# Patient Record
Sex: FEMALE | Race: WHITE | Employment: OTHER | URBAN - METROPOLITAN AREA
[De-identification: names, ages, dates, MRNs, and addresses within clinical notes are randomized per-mention and may not be internally consistent; named-entity substitution may affect disease eponyms.]

---

## 2021-02-09 PROBLEM — J42 CHRONIC BRONCHITIS (HCC): Status: ACTIVE | Noted: 2021-02-09

## 2021-02-09 PROBLEM — J84.10 PULMONARY FIBROSIS (HCC): Status: ACTIVE | Noted: 2021-02-09

## 2021-02-09 PROBLEM — R09.02 HYPOXIA: Status: ACTIVE | Noted: 2021-02-09

## 2021-07-09 LAB
A/G RATIO: 0.9 RATIO (ref 1.1–2.2)
ALBUMIN SERPL-MCNC: 3.4 G/DL (ref 3.4–4.8)
ALP BLD-CCNC: 58 U/L (ref 42–121)
ALT SERPL-CCNC: 12 U/L (ref 10–54)
ANION GAP SERPL CALCULATED.3IONS-SCNC: 11 MEQ/L (ref 3–11)
AST SERPL-CCNC: 16 U/L (ref 10–41)
BASOPHILS ABSOLUTE: 0.1 K/UL (ref 0–0.2)
BASOPHILS RELATIVE PERCENT: 0.8 % (ref 0–1.5)
BILIRUB SERPL-MCNC: 0.5 MG/DL (ref 0.3–1.5)
BUN BLDV-MCNC: 25 MG/DL (ref 8–21)
CALCIUM SERPL-MCNC: 8.8 MG/DL (ref 8.5–10.5)
CHLORIDE BLD-SCNC: 98 MEQ/L (ref 98–107)
CO2: 33 MEQ/L (ref 21–31)
CREAT SERPL-MCNC: 1.6 MG/DL (ref 0.4–1)
CREATININE + EGFR PANEL: 39 ML/MIN
CREATININE URINE: 72.3 MG/DL (ref 22–328)
EOSINOPHILS ABSOLUTE: 0.1 K/UL (ref 0–0.33)
EOSINOPHILS RELATIVE PERCENT: 1.1 % (ref 0–3)
FERRITIN: 27.3 NG/ML (ref 11–307)
GFR NON-AFRICAN AMERICAN: 32 ML/MIN
GLOBULIN: 3.8 G/DL (ref 1.9–3.9)
GLUCOSE BLD-MCNC: 226 MG/DL (ref 70–99)
HCT VFR BLD CALC: 29.7 % (ref 36–44)
HEMOGLOBIN: 9.8 G/DL (ref 12–15)
IRON SATURATION: 58 % (ref 15–55)
IRON: 190 UG/DL (ref 50–170)
LYMPHOCYTES ABSOLUTE: 0.9 K/UL (ref 1.1–4.8)
LYMPHOCYTES RELATIVE PERCENT: 9.6 % (ref 24–44)
MCH RBC QN AUTO: 30.8 PG (ref 28–34)
MCHC RBC AUTO-ENTMCNC: 33.1 G/DL (ref 33–37)
MCV RBC AUTO: 93.1 FL (ref 80–100)
MONOCYTES ABSOLUTE: 0.4 K/UL (ref 0.2–0.7)
MONOCYTES RELATIVE PERCENT: 4.2 % (ref 3.4–9)
NEUTROPHILS ABSOLUTE: 8 K/UL (ref 1.83–8.7)
PDW BLD-RTO: 16.8 % (ref 10.9–14.3)
PLATELET # BLD: 221 K/UL (ref 150–450)
PMV BLD AUTO: 8.4 FL (ref 7.4–10.4)
POTASSIUM SERPL-SCNC: 3.9 MEQ/L (ref 3.6–5)
PROTEIN/CREAT RATIO URINE RAN: 346 MG/G
RBC # BLD: 3.19 M/UL (ref 4–4.9)
SEGMENTED NEUTROPHILS RELATIVE PERCENT: 84.3 % (ref 40–74)
SODIUM BLD-SCNC: 142 MEQ/L (ref 135–145)
TOTAL IRON BINDING CAPACITY: 326 UG/DL (ref 250–450)
TOTAL PROTEIN, URINE: 25 MG/DL
TOTAL PROTEIN: 7.2 G/DL (ref 5.9–7.8)
TRANSFERRIN: 233 MG/DL (ref 192–382)
WBC # BLD: 9.5 K/UL (ref 4.5–11)

## 2022-01-03 LAB
A/G RATIO: 1 RATIO (ref 1.1–2.2)
ALBUMIN SERPL-MCNC: 3.5 G/DL (ref 3.4–4.8)
ALP BLD-CCNC: 65 U/L (ref 42–121)
ALT SERPL-CCNC: 13 U/L (ref 10–54)
ANION GAP SERPL CALCULATED.3IONS-SCNC: 10 MEQ/L (ref 3–11)
AST SERPL-CCNC: 14 U/L (ref 10–41)
BASOPHILS ABSOLUTE: 0 K/UL (ref 0–0.2)
BASOPHILS RELATIVE PERCENT: 0.4 % (ref 0–1.5)
BILIRUB SERPL-MCNC: 0.3 MG/DL (ref 0.3–1.5)
BUN BLDV-MCNC: 24 MG/DL (ref 8–21)
CALCIUM SERPL-MCNC: 9.1 MG/DL (ref 8.5–10.5)
CHLORIDE BLD-SCNC: 98 MEQ/L (ref 98–107)
CO2: 33 MEQ/L (ref 21–31)
CREAT SERPL-MCNC: 1.7 MG/DL (ref 0.4–1)
CREATININE + EGFR PANEL: 36 ML/MIN
CREATININE URINE: 92.2 MG/DL (ref 22–328)
EOSINOPHILS ABSOLUTE: 0.1 K/UL (ref 0–0.33)
EOSINOPHILS RELATIVE PERCENT: 1.1 % (ref 0–3)
GFR NON-AFRICAN AMERICAN: 30 ML/MIN
GLOBULIN: 3.6 G/DL (ref 1.9–3.9)
GLUCOSE BLD-MCNC: 190 MG/DL (ref 70–99)
HCT VFR BLD CALC: 35.2 % (ref 36–44)
HEMOGLOBIN: 11.2 G/DL (ref 12–15)
LYMPHOCYTES ABSOLUTE: 0.7 K/UL (ref 1.1–4.8)
LYMPHOCYTES RELATIVE PERCENT: 7.6 % (ref 24–44)
MAGNESIUM: 1.4 MEQ/L (ref 1.6–2.6)
MCH RBC QN AUTO: 30 PG (ref 28–34)
MCHC RBC AUTO-ENTMCNC: 31.8 G/DL (ref 33–37)
MCV RBC AUTO: 94.5 FL (ref 80–100)
MONOCYTES ABSOLUTE: 0.3 K/UL (ref 0.2–0.7)
MONOCYTES RELATIVE PERCENT: 3.7 % (ref 3.4–9)
NEUTROPHILS ABSOLUTE: 7.5 K/UL (ref 1.83–8.7)
PDW BLD-RTO: 16.1 % (ref 10.9–14.3)
PHOSPHORUS: 3.5 MG/DL (ref 2.5–4.6)
PLATELET # BLD: 198 K/UL (ref 150–450)
PMV BLD AUTO: 9.1 FL (ref 7.4–10.4)
POTASSIUM SERPL-SCNC: 3.9 MEQ/L (ref 3.6–5)
PROTEIN/CREAT RATIO URINE RAN: 510 MG/G
RBC # BLD: 3.72 M/UL (ref 4–4.9)
SEGMENTED NEUTROPHILS RELATIVE PERCENT: 87.2 % (ref 40–74)
SODIUM BLD-SCNC: 141 MEQ/L (ref 135–145)
TOTAL PROTEIN, URINE: 47 MG/DL
TOTAL PROTEIN: 7.1 G/DL (ref 5.9–7.8)
URATE: 9.4 MG/DL (ref 2.3–6.6)
WBC # BLD: 8.6 K/UL (ref 4.5–11)

## 2022-10-20 LAB
ANION GAP SERPL CALCULATED.3IONS-SCNC: 8 MEQ/L (ref 3–11)
BUN BLDV-MCNC: 34 MG/DL (ref 8–21)
CALCIUM SERPL-MCNC: 8.6 MG/DL (ref 8.5–10.5)
CHLORIDE BLD-SCNC: 98 MEQ/L (ref 98–107)
CO2: 33 MEQ/L (ref 21–31)
CREAT SERPL-MCNC: 2.1 MG/DL (ref 0.4–1)
CREATININE + EGFR PANEL: 28 ML/MIN
GFR NON-AFRICAN AMERICAN: 23 ML/MIN
GLUCOSE BLD-MCNC: 56 MG/DL (ref 70–99)
POTASSIUM SERPL-SCNC: 3.9 MEQ/L (ref 3.6–5)
SODIUM BLD-SCNC: 139 MEQ/L (ref 135–145)

## 2023-01-01 ENCOUNTER — HOSPITAL ENCOUNTER (INPATIENT)
Age: 68
LOS: 4 days | Discharge: OTHER INSTITUTION | DRG: 673 | End: 2023-01-05
Attending: EMERGENCY MEDICINE | Admitting: INTERNAL MEDICINE
Payer: MEDICARE

## 2023-01-01 ENCOUNTER — APPOINTMENT (OUTPATIENT)
Dept: GENERAL RADIOLOGY | Age: 68
DRG: 673 | End: 2023-01-01
Payer: MEDICARE

## 2023-01-01 DIAGNOSIS — R52 PAIN: ICD-10-CM

## 2023-01-01 DIAGNOSIS — S81.802A OPEN WOUND OF LEFT KNEE, LEG, AND ANKLE, INITIAL ENCOUNTER: ICD-10-CM

## 2023-01-01 DIAGNOSIS — S81.801A OPEN WOUND OF RIGHT KNEE, LEG, AND ANKLE, INITIAL ENCOUNTER: ICD-10-CM

## 2023-01-01 DIAGNOSIS — S91.002A OPEN WOUND OF LEFT KNEE, LEG, AND ANKLE, INITIAL ENCOUNTER: ICD-10-CM

## 2023-01-01 DIAGNOSIS — S91.001A OPEN WOUND OF RIGHT KNEE, LEG, AND ANKLE, INITIAL ENCOUNTER: ICD-10-CM

## 2023-01-01 DIAGNOSIS — R41.82 ALTERED MENTAL STATUS, UNSPECIFIED ALTERED MENTAL STATUS TYPE: ICD-10-CM

## 2023-01-01 DIAGNOSIS — S81.002A OPEN WOUND OF LEFT KNEE, LEG, AND ANKLE, INITIAL ENCOUNTER: ICD-10-CM

## 2023-01-01 DIAGNOSIS — N39.0 URINARY TRACT INFECTION WITHOUT HEMATURIA, SITE UNSPECIFIED: Primary | ICD-10-CM

## 2023-01-01 DIAGNOSIS — S81.001A OPEN WOUND OF RIGHT KNEE, LEG, AND ANKLE, INITIAL ENCOUNTER: ICD-10-CM

## 2023-01-01 PROBLEM — G93.40 ENCEPHALOPATHY: Status: ACTIVE | Noted: 2023-01-01

## 2023-01-01 PROBLEM — N17.9 AKI (ACUTE KIDNEY INJURY) (HCC): Status: ACTIVE | Noted: 2023-01-01

## 2023-01-01 LAB
ALBUMIN SERPL-MCNC: 3.1 G/DL (ref 3.5–5.2)
ALP BLD-CCNC: 67 U/L (ref 35–104)
ALT SERPL-CCNC: 8 U/L (ref 0–32)
AMORPHOUS: ABNORMAL
ANION GAP SERPL CALCULATED.3IONS-SCNC: 12 MMOL/L (ref 7–16)
AST SERPL-CCNC: 11 U/L (ref 0–31)
BACTERIA: ABNORMAL /HPF
BASOPHILS ABSOLUTE: 0.04 E9/L (ref 0–0.2)
BASOPHILS RELATIVE PERCENT: 0.4 % (ref 0–2)
BILIRUB SERPL-MCNC: 0.3 MG/DL (ref 0–1.2)
BILIRUBIN URINE: NEGATIVE
BLOOD, URINE: ABNORMAL
BUN BLDV-MCNC: 48 MG/DL (ref 6–23)
CALCIUM SERPL-MCNC: 8.7 MG/DL (ref 8.6–10.2)
CHLORIDE BLD-SCNC: 99 MMOL/L (ref 98–107)
CLARITY: CLEAR
CO2: 29 MMOL/L (ref 22–29)
COLOR: YELLOW
CREAT SERPL-MCNC: 2.6 MG/DL (ref 0.5–1)
EOSINOPHILS ABSOLUTE: 0.16 E9/L (ref 0.05–0.5)
EOSINOPHILS RELATIVE PERCENT: 1.4 % (ref 0–6)
GFR SERPL CREATININE-BSD FRML MDRD: 19 ML/MIN/1.73
GLUCOSE BLD-MCNC: 126 MG/DL (ref 74–99)
GLUCOSE URINE: NEGATIVE MG/DL
HCT VFR BLD CALC: 26.7 % (ref 34–48)
HEMOGLOBIN: 8.2 G/DL (ref 11.5–15.5)
IMMATURE GRANULOCYTES #: 0.11 E9/L
IMMATURE GRANULOCYTES %: 1 % (ref 0–5)
KETONES, URINE: NEGATIVE MG/DL
LACTIC ACID: 0.9 MMOL/L (ref 0.5–2.2)
LEUKOCYTE ESTERASE, URINE: ABNORMAL
LYMPHOCYTES ABSOLUTE: 0.79 E9/L (ref 1.5–4)
LYMPHOCYTES RELATIVE PERCENT: 7.1 % (ref 20–42)
MCH RBC QN AUTO: 27.4 PG (ref 26–35)
MCHC RBC AUTO-ENTMCNC: 30.7 % (ref 32–34.5)
MCV RBC AUTO: 89.3 FL (ref 80–99.9)
METER GLUCOSE: 122 MG/DL (ref 74–99)
METER GLUCOSE: 134 MG/DL (ref 74–99)
MONOCYTES ABSOLUTE: 0.66 E9/L (ref 0.1–0.95)
MONOCYTES RELATIVE PERCENT: 5.9 % (ref 2–12)
NEUTROPHILS ABSOLUTE: 9.38 E9/L (ref 1.8–7.3)
NEUTROPHILS RELATIVE PERCENT: 84.2 % (ref 43–80)
NITRITE, URINE: POSITIVE
PDW BLD-RTO: 15.2 FL (ref 11.5–15)
PH UA: 6 (ref 5–9)
PLATELET # BLD: 290 E9/L (ref 130–450)
PMV BLD AUTO: 11.1 FL (ref 7–12)
POTASSIUM REFLEX MAGNESIUM: 4.4 MMOL/L (ref 3.5–5)
PROTEIN UA: 100 MG/DL
RBC # BLD: 2.99 E12/L (ref 3.5–5.5)
RBC UA: ABNORMAL /HPF (ref 0–2)
SODIUM BLD-SCNC: 140 MMOL/L (ref 132–146)
SPECIFIC GRAVITY UA: 1.01 (ref 1–1.03)
TOTAL PROTEIN: 6.8 G/DL (ref 6.4–8.3)
TROPONIN, HIGH SENSITIVITY: 97 NG/L (ref 0–9)
TROPONIN, HIGH SENSITIVITY: 97 NG/L (ref 0–9)
UROBILINOGEN, URINE: 0.2 E.U./DL
WBC # BLD: 11.1 E9/L (ref 4.5–11.5)
WBC UA: >20 /HPF (ref 0–5)

## 2023-01-01 PROCEDURE — 87088 URINE BACTERIA CULTURE: CPT

## 2023-01-01 PROCEDURE — 6360000002 HC RX W HCPCS

## 2023-01-01 PROCEDURE — 6370000000 HC RX 637 (ALT 250 FOR IP)

## 2023-01-01 PROCEDURE — 93005 ELECTROCARDIOGRAM TRACING: CPT | Performed by: PHYSICIAN ASSISTANT

## 2023-01-01 PROCEDURE — 73630 X-RAY EXAM OF FOOT: CPT

## 2023-01-01 PROCEDURE — 2580000003 HC RX 258

## 2023-01-01 PROCEDURE — 83605 ASSAY OF LACTIC ACID: CPT

## 2023-01-01 PROCEDURE — 2700000000 HC OXYGEN THERAPY PER DAY

## 2023-01-01 PROCEDURE — 2500000003 HC RX 250 WO HCPCS: Performed by: INTERNAL MEDICINE

## 2023-01-01 PROCEDURE — 2580000003 HC RX 258: Performed by: EMERGENCY MEDICINE

## 2023-01-01 PROCEDURE — 51701 INSERT BLADDER CATHETER: CPT

## 2023-01-01 PROCEDURE — 82962 GLUCOSE BLOOD TEST: CPT

## 2023-01-01 PROCEDURE — 94640 AIRWAY INHALATION TREATMENT: CPT

## 2023-01-01 PROCEDURE — 6370000000 HC RX 637 (ALT 250 FOR IP): Performed by: INTERNAL MEDICINE

## 2023-01-01 PROCEDURE — 85025 COMPLETE CBC W/AUTO DIFF WBC: CPT

## 2023-01-01 PROCEDURE — 80053 COMPREHEN METABOLIC PANEL: CPT

## 2023-01-01 PROCEDURE — 73590 X-RAY EXAM OF LOWER LEG: CPT

## 2023-01-01 PROCEDURE — 6360000002 HC RX W HCPCS: Performed by: EMERGENCY MEDICINE

## 2023-01-01 PROCEDURE — 99223 1ST HOSP IP/OBS HIGH 75: CPT | Performed by: INTERNAL MEDICINE

## 2023-01-01 PROCEDURE — 99285 EMERGENCY DEPT VISIT HI MDM: CPT

## 2023-01-01 PROCEDURE — 87040 BLOOD CULTURE FOR BACTERIA: CPT

## 2023-01-01 PROCEDURE — 1200000000 HC SEMI PRIVATE

## 2023-01-01 PROCEDURE — 84484 ASSAY OF TROPONIN QUANT: CPT

## 2023-01-01 PROCEDURE — 87186 SC STD MICRODIL/AGAR DIL: CPT

## 2023-01-01 PROCEDURE — 81001 URINALYSIS AUTO W/SCOPE: CPT

## 2023-01-01 RX ORDER — DOCUSATE SODIUM 100 MG/1
100 CAPSULE, LIQUID FILLED ORAL DAILY
Status: DISCONTINUED | OUTPATIENT
Start: 2023-01-01 | End: 2023-01-05 | Stop reason: HOSPADM

## 2023-01-01 RX ORDER — DIGOXIN 125 MCG
125 TABLET ORAL DAILY
Status: DISCONTINUED | OUTPATIENT
Start: 2023-01-01 | End: 2023-01-05 | Stop reason: HOSPADM

## 2023-01-01 RX ORDER — INSULIN GLARGINE 100 [IU]/ML
15 INJECTION, SOLUTION SUBCUTANEOUS NIGHTLY
Status: DISCONTINUED | OUTPATIENT
Start: 2023-01-01 | End: 2023-01-05 | Stop reason: HOSPADM

## 2023-01-01 RX ORDER — ASPIRIN 81 MG/1
81 TABLET, CHEWABLE ORAL DAILY
Status: DISCONTINUED | OUTPATIENT
Start: 2023-01-01 | End: 2023-01-05 | Stop reason: HOSPADM

## 2023-01-01 RX ORDER — POLYETHYLENE GLYCOL 3350 17 G/17G
17 POWDER, FOR SOLUTION ORAL DAILY PRN
Status: DISCONTINUED | OUTPATIENT
Start: 2023-01-01 | End: 2023-01-05 | Stop reason: HOSPADM

## 2023-01-01 RX ORDER — SODIUM CHLORIDE 9 MG/ML
INJECTION, SOLUTION INTRAVENOUS CONTINUOUS
Status: DISCONTINUED | OUTPATIENT
Start: 2023-01-01 | End: 2023-01-05

## 2023-01-01 RX ORDER — INSULIN LISPRO 100 [IU]/ML
0-4 INJECTION, SOLUTION INTRAVENOUS; SUBCUTANEOUS
Status: DISCONTINUED | OUTPATIENT
Start: 2023-01-01 | End: 2023-01-05 | Stop reason: HOSPADM

## 2023-01-01 RX ORDER — SODIUM CHLORIDE 9 MG/ML
INJECTION, SOLUTION INTRAVENOUS PRN
Status: DISCONTINUED | OUTPATIENT
Start: 2023-01-01 | End: 2023-01-05 | Stop reason: HOSPADM

## 2023-01-01 RX ORDER — INSULIN GLARGINE 100 [IU]/ML
22 INJECTION, SOLUTION SUBCUTANEOUS EVERY MORNING
Status: DISCONTINUED | OUTPATIENT
Start: 2023-01-02 | End: 2023-01-05 | Stop reason: HOSPADM

## 2023-01-01 RX ORDER — INSULIN LISPRO 100 [IU]/ML
0-4 INJECTION, SOLUTION INTRAVENOUS; SUBCUTANEOUS NIGHTLY
Status: DISCONTINUED | OUTPATIENT
Start: 2023-01-01 | End: 2023-01-05 | Stop reason: HOSPADM

## 2023-01-01 RX ORDER — METOLAZONE 2.5 MG/1
2.5 TABLET ORAL DAILY
Status: DISCONTINUED | OUTPATIENT
Start: 2023-01-01 | End: 2023-01-05

## 2023-01-01 RX ORDER — PIRFENIDONE 801 MG/1
801 TABLET, FILM COATED ORAL DAILY
Status: DISCONTINUED | OUTPATIENT
Start: 2023-01-02 | End: 2023-01-04

## 2023-01-01 RX ORDER — DILTIAZEM HYDROCHLORIDE 120 MG/1
120 CAPSULE, EXTENDED RELEASE ORAL EVERY 12 HOURS SCHEDULED
Status: DISCONTINUED | OUTPATIENT
Start: 2023-01-01 | End: 2023-01-05 | Stop reason: HOSPADM

## 2023-01-01 RX ORDER — PRAVASTATIN SODIUM 20 MG
20 TABLET ORAL DAILY
Status: DISCONTINUED | OUTPATIENT
Start: 2023-01-01 | End: 2023-01-05 | Stop reason: HOSPADM

## 2023-01-01 RX ORDER — ONDANSETRON 4 MG/1
4 TABLET, ORALLY DISINTEGRATING ORAL EVERY 8 HOURS PRN
Status: DISCONTINUED | OUTPATIENT
Start: 2023-01-01 | End: 2023-01-05 | Stop reason: HOSPADM

## 2023-01-01 RX ORDER — LANOLIN ALCOHOL/MO/W.PET/CERES
400 CREAM (GRAM) TOPICAL DAILY
Status: DISCONTINUED | OUTPATIENT
Start: 2023-01-01 | End: 2023-01-05 | Stop reason: HOSPADM

## 2023-01-01 RX ORDER — ACETAMINOPHEN 325 MG/1
650 TABLET ORAL EVERY 6 HOURS PRN
Status: DISCONTINUED | OUTPATIENT
Start: 2023-01-01 | End: 2023-01-02

## 2023-01-01 RX ORDER — DEXTROSE MONOHYDRATE 100 MG/ML
INJECTION, SOLUTION INTRAVENOUS CONTINUOUS PRN
Status: DISCONTINUED | OUTPATIENT
Start: 2023-01-01 | End: 2023-01-05 | Stop reason: HOSPADM

## 2023-01-01 RX ORDER — IPRATROPIUM BROMIDE AND ALBUTEROL SULFATE 2.5; .5 MG/3ML; MG/3ML
1 SOLUTION RESPIRATORY (INHALATION) EVERY 4 HOURS PRN
Status: DISCONTINUED | OUTPATIENT
Start: 2023-01-01 | End: 2023-01-05 | Stop reason: HOSPADM

## 2023-01-01 RX ORDER — INSULIN GLARGINE 100 [IU]/ML
INJECTION, SOLUTION SUBCUTANEOUS 2 TIMES DAILY
Status: ON HOLD | COMMUNITY
End: 2023-01-05 | Stop reason: HOSPADM

## 2023-01-01 RX ORDER — ONDANSETRON 2 MG/ML
4 INJECTION INTRAMUSCULAR; INTRAVENOUS EVERY 6 HOURS PRN
Status: DISCONTINUED | OUTPATIENT
Start: 2023-01-01 | End: 2023-01-05 | Stop reason: HOSPADM

## 2023-01-01 RX ORDER — FERROUS SULFATE 325(65) MG
325 TABLET ORAL
Status: DISCONTINUED | OUTPATIENT
Start: 2023-01-02 | End: 2023-01-05 | Stop reason: HOSPADM

## 2023-01-01 RX ORDER — ACETAMINOPHEN 650 MG/1
650 SUPPOSITORY RECTAL EVERY 6 HOURS PRN
Status: DISCONTINUED | OUTPATIENT
Start: 2023-01-01 | End: 2023-01-02

## 2023-01-01 RX ORDER — BUDESONIDE 0.5 MG/2ML
0.5 INHALANT ORAL 2 TIMES DAILY
Status: DISCONTINUED | OUTPATIENT
Start: 2023-01-01 | End: 2023-01-05 | Stop reason: HOSPADM

## 2023-01-01 RX ORDER — SODIUM CHLORIDE 0.9 % (FLUSH) 0.9 %
5-40 SYRINGE (ML) INJECTION EVERY 12 HOURS SCHEDULED
Status: DISCONTINUED | OUTPATIENT
Start: 2023-01-01 | End: 2023-01-05 | Stop reason: HOSPADM

## 2023-01-01 RX ORDER — LEVOTHYROXINE SODIUM 0.1 MG/1
200 TABLET ORAL DAILY
Status: DISCONTINUED | OUTPATIENT
Start: 2023-01-02 | End: 2023-01-05 | Stop reason: HOSPADM

## 2023-01-01 RX ORDER — FLUOXETINE HYDROCHLORIDE 20 MG/1
20 CAPSULE ORAL 2 TIMES DAILY
Status: DISCONTINUED | OUTPATIENT
Start: 2023-01-01 | End: 2023-01-05 | Stop reason: HOSPADM

## 2023-01-01 RX ORDER — GABAPENTIN 300 MG/1
300 CAPSULE ORAL 3 TIMES DAILY
Status: DISCONTINUED | OUTPATIENT
Start: 2023-01-01 | End: 2023-01-04

## 2023-01-01 RX ORDER — SODIUM CHLORIDE 0.9 % (FLUSH) 0.9 %
5-40 SYRINGE (ML) INJECTION PRN
Status: DISCONTINUED | OUTPATIENT
Start: 2023-01-01 | End: 2023-01-05 | Stop reason: HOSPADM

## 2023-01-01 RX ADMIN — DOCUSATE SODIUM 100 MG: 100 CAPSULE, LIQUID FILLED ORAL at 21:54

## 2023-01-01 RX ADMIN — SODIUM CHLORIDE: 9 INJECTION, SOLUTION INTRAVENOUS at 23:27

## 2023-01-01 RX ADMIN — INSULIN GLARGINE 15 UNITS: 100 INJECTION, SOLUTION SUBCUTANEOUS at 21:55

## 2023-01-01 RX ADMIN — SODIUM CHLORIDE, PRESERVATIVE FREE 10 ML: 5 INJECTION INTRAVENOUS at 22:00

## 2023-01-01 RX ADMIN — ASPIRIN 81 MG: 81 TABLET, CHEWABLE ORAL at 21:54

## 2023-01-01 RX ADMIN — MICONAZOLE NITRATE: 2 POWDER TOPICAL at 23:50

## 2023-01-01 RX ADMIN — BUDESONIDE 500 MCG: 0.5 SUSPENSION RESPIRATORY (INHALATION) at 20:41

## 2023-01-01 RX ADMIN — PETROLATUM: 420 OINTMENT TOPICAL at 23:50

## 2023-01-01 RX ADMIN — DILTIAZEM HYDROCHLORIDE 120 MG: 120 CAPSULE, EXTENDED RELEASE ORAL at 21:54

## 2023-01-01 RX ADMIN — WATER 1000 MG: 1 INJECTION INTRAMUSCULAR; INTRAVENOUS; SUBCUTANEOUS at 18:05

## 2023-01-01 RX ADMIN — FLUOXETINE 20 MG: 20 CAPSULE ORAL at 21:54

## 2023-01-01 RX ADMIN — IPRATROPIUM BROMIDE AND ALBUTEROL SULFATE 3 ML: .5; 2.5 SOLUTION RESPIRATORY (INHALATION) at 20:42

## 2023-01-01 RX ADMIN — GABAPENTIN 300 MG: 300 CAPSULE ORAL at 21:54

## 2023-01-01 RX ADMIN — Medication 400 MG: at 21:55

## 2023-01-01 RX ADMIN — APIXABAN 5 MG: 5 TABLET, FILM COATED ORAL at 21:54

## 2023-01-01 RX ADMIN — METOLAZONE 2.5 MG: 2.5 TABLET ORAL at 21:54

## 2023-01-01 RX ADMIN — PRAVASTATIN SODIUM 20 MG: 20 TABLET ORAL at 21:54

## 2023-01-01 RX ADMIN — DIGOXIN 125 MCG: 0.12 TABLET ORAL at 21:54

## 2023-01-01 ASSESSMENT — ENCOUNTER SYMPTOMS
EYE PAIN: 0
SHORTNESS OF BREATH: 1
COUGH: 0
BACK PAIN: 0
CONSTIPATION: 0
ABDOMINAL PAIN: 0
SHORTNESS OF BREATH: 0
DIARRHEA: 0
SORE THROAT: 0
NAUSEA: 0
SINUS PAIN: 0
VOMITING: 0

## 2023-01-01 ASSESSMENT — PAIN DESCRIPTION - ORIENTATION: ORIENTATION: RIGHT

## 2023-01-01 ASSESSMENT — PAIN DESCRIPTION - FREQUENCY: FREQUENCY: CONTINUOUS

## 2023-01-01 ASSESSMENT — PAIN DESCRIPTION - ONSET: ONSET: ON-GOING

## 2023-01-01 ASSESSMENT — PAIN DESCRIPTION - LOCATION: LOCATION: HAND

## 2023-01-01 ASSESSMENT — PAIN DESCRIPTION - PAIN TYPE: TYPE: ACUTE PAIN

## 2023-01-01 ASSESSMENT — PAIN DESCRIPTION - DESCRIPTORS: DESCRIPTORS: BURNING

## 2023-01-01 ASSESSMENT — PAIN SCALES - GENERAL: PAINLEVEL_OUTOF10: 4
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
## 2023-01-02 ENCOUNTER — APPOINTMENT (OUTPATIENT)
Dept: MRI IMAGING | Age: 68
DRG: 673 | End: 2023-01-02
Payer: MEDICARE

## 2023-01-02 ENCOUNTER — ANESTHESIA EVENT (OUTPATIENT)
Dept: OPERATING ROOM | Age: 68
DRG: 673 | End: 2023-01-02
Payer: MEDICARE

## 2023-01-02 ENCOUNTER — APPOINTMENT (OUTPATIENT)
Dept: GENERAL RADIOLOGY | Age: 68
DRG: 673 | End: 2023-01-02
Payer: MEDICARE

## 2023-01-02 ENCOUNTER — ANESTHESIA (OUTPATIENT)
Dept: OPERATING ROOM | Age: 68
DRG: 673 | End: 2023-01-02
Payer: MEDICARE

## 2023-01-02 ENCOUNTER — APPOINTMENT (OUTPATIENT)
Dept: ULTRASOUND IMAGING | Age: 68
DRG: 673 | End: 2023-01-02
Payer: MEDICARE

## 2023-01-02 LAB
ANION GAP SERPL CALCULATED.3IONS-SCNC: 8 MMOL/L (ref 7–16)
BASOPHILS ABSOLUTE: 0.04 E9/L (ref 0–0.2)
BASOPHILS RELATIVE PERCENT: 0.3 % (ref 0–2)
BUN BLDV-MCNC: 49 MG/DL (ref 6–23)
CALCIUM SERPL-MCNC: 8.7 MG/DL (ref 8.6–10.2)
CHLORIDE BLD-SCNC: 101 MMOL/L (ref 98–107)
CO2: 31 MMOL/L (ref 22–29)
CREAT SERPL-MCNC: 2.6 MG/DL (ref 0.5–1)
EKG ATRIAL RATE: 97 BPM
EKG Q-T INTERVAL: 420 MS
EKG QRS DURATION: 166 MS
EKG QTC CALCULATION (BAZETT): 469 MS
EKG R AXIS: 95 DEGREES
EKG T AXIS: -65 DEGREES
EKG VENTRICULAR RATE: 75 BPM
EOSINOPHILS ABSOLUTE: 0.14 E9/L (ref 0.05–0.5)
EOSINOPHILS RELATIVE PERCENT: 1.2 % (ref 0–6)
GFR SERPL CREATININE-BSD FRML MDRD: 19 ML/MIN/1.73
GLUCOSE BLD-MCNC: 160 MG/DL (ref 74–99)
HCT VFR BLD CALC: 28.1 % (ref 34–48)
HEMOGLOBIN: 8.2 G/DL (ref 11.5–15.5)
IMMATURE GRANULOCYTES #: 0.11 E9/L
IMMATURE GRANULOCYTES %: 0.9 % (ref 0–5)
LYMPHOCYTES ABSOLUTE: 1.08 E9/L (ref 1.5–4)
LYMPHOCYTES RELATIVE PERCENT: 9.2 % (ref 20–42)
MCH RBC QN AUTO: 26.6 PG (ref 26–35)
MCHC RBC AUTO-ENTMCNC: 29.2 % (ref 32–34.5)
MCV RBC AUTO: 91.2 FL (ref 80–99.9)
METER GLUCOSE: 120 MG/DL (ref 74–99)
METER GLUCOSE: 128 MG/DL (ref 74–99)
METER GLUCOSE: 130 MG/DL (ref 74–99)
METER GLUCOSE: 132 MG/DL (ref 74–99)
METER GLUCOSE: 146 MG/DL (ref 74–99)
MONOCYTES ABSOLUTE: 0.68 E9/L (ref 0.1–0.95)
MONOCYTES RELATIVE PERCENT: 5.8 % (ref 2–12)
NEUTROPHILS ABSOLUTE: 9.69 E9/L (ref 1.8–7.3)
NEUTROPHILS RELATIVE PERCENT: 82.6 % (ref 43–80)
PDW BLD-RTO: 15.5 FL (ref 11.5–15)
PLATELET # BLD: 307 E9/L (ref 130–450)
PMV BLD AUTO: 11.1 FL (ref 7–12)
POTASSIUM REFLEX MAGNESIUM: 4 MMOL/L (ref 3.5–5)
RBC # BLD: 3.08 E12/L (ref 3.5–5.5)
SODIUM BLD-SCNC: 140 MMOL/L (ref 132–146)
WBC # BLD: 11.7 E9/L (ref 4.5–11.5)

## 2023-01-02 PROCEDURE — 73718 MRI LOWER EXTREMITY W/O DYE: CPT

## 2023-01-02 PROCEDURE — 2709999900 HC NON-CHARGEABLE SUPPLY

## 2023-01-02 PROCEDURE — 88304 TISSUE EXAM BY PATHOLOGIST: CPT

## 2023-01-02 PROCEDURE — 6360000002 HC RX W HCPCS: Performed by: NURSE ANESTHETIST, CERTIFIED REGISTERED

## 2023-01-02 PROCEDURE — 87116 MYCOBACTERIA CULTURE: CPT

## 2023-01-02 PROCEDURE — 0JQQ0ZZ REPAIR RIGHT FOOT SUBCUTANEOUS TISSUE AND FASCIA, OPEN APPROACH: ICD-10-PCS

## 2023-01-02 PROCEDURE — 80048 BASIC METABOLIC PNL TOTAL CA: CPT

## 2023-01-02 PROCEDURE — 7100000010 HC PHASE II RECOVERY - FIRST 15 MIN

## 2023-01-02 PROCEDURE — 0HRNX74 REPLACEMENT OF LEFT FOOT SKIN WITH AUTOLOGOUS TISSUE SUBSTITUTE, PARTIAL THICKNESS, EXTERNAL APPROACH: ICD-10-PCS

## 2023-01-02 PROCEDURE — 2700000000 HC OXYGEN THERAPY PER DAY

## 2023-01-02 PROCEDURE — 87186 SC STD MICRODIL/AGAR DIL: CPT

## 2023-01-02 PROCEDURE — 3700000000 HC ANESTHESIA ATTENDED CARE

## 2023-01-02 PROCEDURE — 0JBR0ZZ EXCISION OF LEFT FOOT SUBCUTANEOUS TISSUE AND FASCIA, OPEN APPROACH: ICD-10-PCS

## 2023-01-02 PROCEDURE — 3600000002 HC SURGERY LEVEL 2 BASE

## 2023-01-02 PROCEDURE — 93010 ELECTROCARDIOGRAM REPORT: CPT | Performed by: INTERNAL MEDICINE

## 2023-01-02 PROCEDURE — 87077 CULTURE AEROBIC IDENTIFY: CPT

## 2023-01-02 PROCEDURE — 87206 SMEAR FLUORESCENT/ACID STAI: CPT

## 2023-01-02 PROCEDURE — 87205 SMEAR GRAM STAIN: CPT

## 2023-01-02 PROCEDURE — 73130 X-RAY EXAM OF HAND: CPT

## 2023-01-02 PROCEDURE — 85025 COMPLETE CBC W/AUTO DIFF WBC: CPT

## 2023-01-02 PROCEDURE — 3700000001 HC ADD 15 MINUTES (ANESTHESIA)

## 2023-01-02 PROCEDURE — 36415 COLL VENOUS BLD VENIPUNCTURE: CPT

## 2023-01-02 PROCEDURE — 99232 SBSQ HOSP IP/OBS MODERATE 35: CPT | Performed by: NURSE PRACTITIONER

## 2023-01-02 PROCEDURE — 94640 AIRWAY INHALATION TREATMENT: CPT

## 2023-01-02 PROCEDURE — 2580000003 HC RX 258: Performed by: NURSE ANESTHETIST, CERTIFIED REGISTERED

## 2023-01-02 PROCEDURE — 2580000003 HC RX 258

## 2023-01-02 PROCEDURE — 1200000000 HC SEMI PRIVATE

## 2023-01-02 PROCEDURE — 87075 CULTR BACTERIA EXCEPT BLOOD: CPT

## 2023-01-02 PROCEDURE — 99232 SBSQ HOSP IP/OBS MODERATE 35: CPT | Performed by: INTERNAL MEDICINE

## 2023-01-02 PROCEDURE — 82962 GLUCOSE BLOOD TEST: CPT

## 2023-01-02 PROCEDURE — 6370000000 HC RX 637 (ALT 250 FOR IP)

## 2023-01-02 PROCEDURE — 7100000011 HC PHASE II RECOVERY - ADDTL 15 MIN

## 2023-01-02 PROCEDURE — 87070 CULTURE OTHR SPECIMN AEROBIC: CPT

## 2023-01-02 PROCEDURE — 3600000012 HC SURGERY LEVEL 2 ADDTL 15MIN

## 2023-01-02 PROCEDURE — 87102 FUNGUS ISOLATION CULTURE: CPT

## 2023-01-02 DEVICE — IMPLANTABLE DEVICE: Type: IMPLANTABLE DEVICE | Site: FOOT | Status: FUNCTIONAL

## 2023-01-02 RX ORDER — ACETAMINOPHEN 325 MG/1
650 TABLET ORAL EVERY 4 HOURS PRN
Status: DISCONTINUED | OUTPATIENT
Start: 2023-01-02 | End: 2023-01-05 | Stop reason: HOSPADM

## 2023-01-02 RX ORDER — SODIUM CHLORIDE 0.9 % (FLUSH) 0.9 %
5-40 SYRINGE (ML) INJECTION EVERY 12 HOURS SCHEDULED
Status: DISCONTINUED | OUTPATIENT
Start: 2023-01-02 | End: 2023-01-02 | Stop reason: HOSPADM

## 2023-01-02 RX ORDER — PROPOFOL 10 MG/ML
INJECTION, EMULSION INTRAVENOUS CONTINUOUS PRN
Status: DISCONTINUED | OUTPATIENT
Start: 2023-01-02 | End: 2023-01-02 | Stop reason: SDUPTHER

## 2023-01-02 RX ORDER — FENTANYL CITRATE 50 UG/ML
INJECTION, SOLUTION INTRAMUSCULAR; INTRAVENOUS PRN
Status: DISCONTINUED | OUTPATIENT
Start: 2023-01-02 | End: 2023-01-02 | Stop reason: SDUPTHER

## 2023-01-02 RX ORDER — ONDANSETRON 2 MG/ML
INJECTION INTRAMUSCULAR; INTRAVENOUS PRN
Status: DISCONTINUED | OUTPATIENT
Start: 2023-01-02 | End: 2023-01-02 | Stop reason: SDUPTHER

## 2023-01-02 RX ORDER — TRAMADOL HYDROCHLORIDE 50 MG/1
50 TABLET ORAL EVERY 6 HOURS PRN
Status: DISCONTINUED | OUTPATIENT
Start: 2023-01-02 | End: 2023-01-05 | Stop reason: HOSPADM

## 2023-01-02 RX ORDER — SODIUM CHLORIDE 9 MG/ML
INJECTION, SOLUTION INTRAVENOUS PRN
Status: DISCONTINUED | OUTPATIENT
Start: 2023-01-02 | End: 2023-01-02 | Stop reason: HOSPADM

## 2023-01-02 RX ORDER — ONDANSETRON 2 MG/ML
4 INJECTION INTRAMUSCULAR; INTRAVENOUS
Status: DISCONTINUED | OUTPATIENT
Start: 2023-01-02 | End: 2023-01-02 | Stop reason: HOSPADM

## 2023-01-02 RX ORDER — SODIUM CHLORIDE 0.9 % (FLUSH) 0.9 %
5-40 SYRINGE (ML) INJECTION PRN
Status: DISCONTINUED | OUTPATIENT
Start: 2023-01-02 | End: 2023-01-02 | Stop reason: HOSPADM

## 2023-01-02 RX ORDER — SODIUM CHLORIDE 9 MG/ML
INJECTION, SOLUTION INTRAVENOUS CONTINUOUS PRN
Status: DISCONTINUED | OUTPATIENT
Start: 2023-01-02 | End: 2023-01-02 | Stop reason: SDUPTHER

## 2023-01-02 RX ORDER — FENTANYL CITRATE 50 UG/ML
25 INJECTION, SOLUTION INTRAMUSCULAR; INTRAVENOUS EVERY 5 MIN PRN
Status: DISCONTINUED | OUTPATIENT
Start: 2023-01-02 | End: 2023-01-02 | Stop reason: HOSPADM

## 2023-01-02 RX ADMIN — PROPOFOL 60 MCG/KG/MIN: 10 INJECTION, EMULSION INTRAVENOUS at 17:40

## 2023-01-02 RX ADMIN — SODIUM CHLORIDE: 9 INJECTION, SOLUTION INTRAVENOUS at 20:21

## 2023-01-02 RX ADMIN — FENTANYL CITRATE 50 MCG: 50 INJECTION, SOLUTION INTRAMUSCULAR; INTRAVENOUS at 17:48

## 2023-01-02 RX ADMIN — SODIUM CHLORIDE, PRESERVATIVE FREE 10 ML: 5 INJECTION INTRAVENOUS at 20:23

## 2023-01-02 RX ADMIN — ONDANSETRON 4 MG: 2 INJECTION INTRAMUSCULAR; INTRAVENOUS at 17:49

## 2023-01-02 RX ADMIN — INSULIN GLARGINE 15 UNITS: 100 INJECTION, SOLUTION SUBCUTANEOUS at 20:23

## 2023-01-02 RX ADMIN — FLUOXETINE 20 MG: 20 CAPSULE ORAL at 20:22

## 2023-01-02 RX ADMIN — DILTIAZEM HYDROCHLORIDE 120 MG: 120 CAPSULE, EXTENDED RELEASE ORAL at 20:22

## 2023-01-02 RX ADMIN — GABAPENTIN 300 MG: 300 CAPSULE ORAL at 20:23

## 2023-01-02 RX ADMIN — APIXABAN 5 MG: 5 TABLET, FILM COATED ORAL at 20:22

## 2023-01-02 RX ADMIN — BUDESONIDE 500 MCG: 0.5 SUSPENSION RESPIRATORY (INHALATION) at 07:50

## 2023-01-02 RX ADMIN — MICONAZOLE NITRATE: 2 POWDER TOPICAL at 08:44

## 2023-01-02 RX ADMIN — SODIUM CHLORIDE: 9 INJECTION, SOLUTION INTRAVENOUS at 10:23

## 2023-01-02 RX ADMIN — PETROLATUM: 420 OINTMENT TOPICAL at 20:21

## 2023-01-02 RX ADMIN — BUDESONIDE 500 MCG: 0.5 SUSPENSION RESPIRATORY (INHALATION) at 19:47

## 2023-01-02 RX ADMIN — DIGOXIN 125 MCG: 0.12 TABLET ORAL at 15:08

## 2023-01-02 RX ADMIN — MICONAZOLE NITRATE: 2 POWDER TOPICAL at 20:21

## 2023-01-02 RX ADMIN — FENTANYL CITRATE 50 MCG: 50 INJECTION, SOLUTION INTRAMUSCULAR; INTRAVENOUS at 17:40

## 2023-01-02 RX ADMIN — SODIUM CHLORIDE: 9 INJECTION, SOLUTION INTRAVENOUS at 16:30

## 2023-01-02 ASSESSMENT — ENCOUNTER SYMPTOMS: SHORTNESS OF BREATH: 1

## 2023-01-02 ASSESSMENT — PAIN SCALES - GENERAL
PAINLEVEL_OUTOF10: 0

## 2023-01-02 NOTE — PROGRESS NOTES
Page placed to Dr. Jonny Vizcaino regarding xray results.     Electronically signed by Arden Brittle, RN on 1/2/2023 at 4:17 PM

## 2023-01-02 NOTE — OP NOTE
Operative Note      Patient: Dianne Pinzon  YOB: 1955  MRN: [id number]    Date of Procedure: 1/2/2023    Pre-Op Diagnosis: Right foot laceration with wound to subcutaneous layer; Left anterior leg wound to dermis and left foot wound to subcutaneous layer    Post-Op Diagnosis: Same       Procedure(s):  BILATERAL DEBRIDEMENT INCISION AND DRAINAGE OF FEET AND LEGS, RESECECTION OF ALL NONVIABLE TISSUE AND BONE WITH SKIN GRAFT SUBSTITUTE APPLICATION TO LEFT FOOT WOUND    Surgeon(s):  Law Puri MD    Assistant: Joaquina Oliveira DPM, PGY1    Anesthesia: Monitor Anesthesia Care    Estimated Blood Loss (mL): 56WD    Complications: None    Specimens:   ID Type Source Tests Collected by Time Destination   1 : LEFT FOOT TISSUE CULTURE Tissue Tissue CULTURE, ANAEROBIC, CULTURE, FUNGUS, GRAM STAIN, CULTURE, SURGICAL, CULTURE WITH SMEAR, ACID FAST BACILLIUS Law Brandon MD 1/2/2023 1753    2 : RIGHT FOOT TISSUE CULTURE Tissue Tissue CULTURE, ANAEROBIC, CULTURE, FUNGUS, GRAM STAIN, CULTURE, SURGICAL, CULTURE WITH SMEAR, ACID FAST BACILLIUS Law Puri MD 1/2/2023 1804    A : LEFT FOOT TISSUE Tissue Tissue SURGICAL PATHOLOGY Law Puri MD 1/2/2023 1754    B : RIGHT FOOT TISSUE  Tissue Tissue SURGICAL PATHOLOGY Law Puri MD 1/2/2023 1813        Implants:  Implant Name Type Inv.  Item Serial No.  Lot No. LRB No. Used Action   GRAFT BNE [id number] THK04 8MM DERM PLIABLE MTRX ALLOPATCH - [id number]  GRAFT BNE [id number] THK04 8MM DERM PLIABLE MTRX ALLOPATCH [id number] [employer]-  Left 1 Implanted         Drains:   [REMOVED] External Urinary Catheter (Removed)   Site Assessment Clean,dry & intact 01/01/23 2107   Placement Initiated 01/01/23 2107   Catheter Care Catheter/Wick replaced;Suction Canister/Tubing changed 01/01/23 2107   Perineal Care Yes 01/01/23 2107   Suction 40 mmgHg continuous 01/01/23 2107       Findings: RIGHT FOOT LACERATION OF HALLUX AND 2ND DIGIT WOUNDS TO SUBCUTANEOUS LEVEL; LEFT ANTERIOR LEG WOUND TO DERMIS AND LEFT LATERAL MIDFOOT WOUND TO SUBCUTANEOUS LAYER    Detailed Description of Procedure:     INDICATIONS:    Ms Young Cabrera is a 68 yo female patient with PMH of Diabetes and Charcot neuroarthropathy who was admitted for altered mental status secondary to UTI and sepsis. Patient was found to have injury to right hallux and 2nd digit as well as left anterior tibia. Afebrile, WBC 11.7, ESR 36. X-rays of right foot showing no fracture or dislocation, no signs of osteomyelitis or gas and only hallux IPJ arthritis. Left foot x-rays showing no signs of acute osteomyelitis with changes consistent with prior Charcot reconstruction with fused TMTJ with no retained hardware. MRI of b/l feet performed with impression pending. Patient gave written consent for procedure as indicated. On examination, right hallux and 2nd digit with laceration wound to subcutaneous level with avulsed hallux plantar media skin and nail avulsed. No probing to bone. Left anterior leg with avulsed wound measuring 7x4.5x0.1cm to dermis from trauma. Left lateral midfoot fibrogranular wound to subcutaneous level measuring 3x1.5x0.2cm stable without any probing to bone. PROCEDURES IN DETAIL:    Following written consent, patient was brought into the OR and placed in supine position on the operating table. Then, a member of the anesthesia team administered MAC. Then, a member of the surgical team prepped and draped b/l lower extremities in the usual normal sterile fashion. No tourniquet was used during the procedure. PROCEDURE #1:  Left lower leg wound debridement    Attention was directed to the anterior aspect of the left tibia where a wound measuring 7x4.5x0.1cm to dermis was observed. Using a sterile rongeur, removal of all necrotic and non viable skin and soft tissue were removed.  Upon examination, healthy bleeding dermis achieved and no deeper wound. We then proceeded in irrigating the wound using copious amounts of normal sterile saline. PROCEDURE #2: Left lateral midfoot wound debridement to subcutaneous layer    Attention was directed to the lateral left midfoot wound measuring 3x1.5x0.2cm with fibrogranular wound base. Using a sterile rongeur, sharp excisional debridement was performed full thickness to subcutaneous level removing all non viable skin and soft tissue and sent the specimen of left foot soft tissue for pathology and culture. We then proceeded in irrigating the wound using copious amounts of normal sterile saline. PROCEDURE #3: Left foot application of skin graft substitute    Then, we successfully applied a 4x4cm Allopatch skin graft substitute onto the left lateral midfoot wound and secured it using staples. Fenestration was performed using a 15 blade scalpel with stab incisions performed. The surgical sites of the left lower extremity was then dressed with Adaptic, 4x4 gauze, Kerlix and ACE bandages. PROCEDURE #4: Right foot repair of laceration and incision and drainage to subcutaneous level    Attention was now directed to the right foot hallux and 2nd digit lacerated wounds to subcutaneous level. We performed pulse irrigation lavage using a total of 3000cc normal sterile saline. Then, sharp excisional debridement was performed to remove all non viable skin and soft tissue using a 15 blade scalpel and specimen was sent as right foot soft tissue for culture and pathology. At this time, the nail was avulsed and removed and exposing a healthy nail bed without any laceration or wound. We then used 4.0 nylon simple sutures on hallux avulsed skin as retention sutures to partially cover the laceration. Xeroform was applied to the surgical site and dressed with 4x4 gauze, Kerlix and ACE bandages. The patient tolerated the procedure and anesthesia well. At the conclusion of the case, all counts were deemed accurate and correct. Patient was transported to PACU without any complications with vital signs stable.     Electronically signed by Maylin Tellez MD on 1/2/2023 at 6:17 PM

## 2023-01-02 NOTE — BRIEF OP NOTE
Brief Postoperative Note      Patient: Orson Dakin  YOB: 1955  MRN: [id number]    Date of Procedure: 1/2/2023    Pre-Op Diagnosis: B/L foot and leg wounds    Post-Op Diagnosis: B/L foot and leg wounds       Procedure(s):  BILATERAL DEBRIDEMENT INCISION AND DRAINAGE OF FEET AND LEGS, RESECECTION OF ALL NONVIABLE TISSUE AND BONE    Surgeon(s):  Law Bang MD    Assistant:  Surgical Assistant: Niko Ding DPM PGY1    Anesthesia: Monitor Anesthesia Care    Estimated Blood Loss (mL): Minimal    Complications: None    Specimens:   ID Type Source Tests Collected by Time Destination   1 : LEFT FOOT TISSUE CULTURE Tissue Tissue CULTURE, ANAEROBIC, CULTURE, FUNGUS, GRAM STAIN, CULTURE, SURGICAL, CULTURE WITH SMEAR, ACID FAST BACILLIUS Law Tripp MD 1/2/2023 1753    2 : RIGHT FOOT TISSUE CULTURE Tissue Tissue CULTURE, ANAEROBIC, CULTURE, FUNGUS, GRAM STAIN, CULTURE, SURGICAL, CULTURE WITH SMEAR, ACID FAST BACILLIUS Law Bang MD 1/2/2023 1804    A : LEFT FOOT TISSUE Tissue Tissue SURGICAL PATHOLOGY Law Bang MD 1/2/2023 1754    B : RIGHT FOOT TISSUE  Tissue Tissue SURGICAL PATHOLOGY Law Bang MD 1/2/2023 1813        Implants:  Implant Name Type Inv.  Item Serial No.  Lot No. LRB No. Used Action   GRAFT BNE [id number] THK04 8MM DERM PLIABLE MTRX ALLOPATCH - [id number]  GRAFT BNE [id number] THK04 8MM DERM PLIABLE MTRX ALLOPATCH [id number] [employer]-  Left 1 Implanted         Drains:   [REMOVED] External Urinary Catheter (Removed)   Site Assessment Clean,dry & intact 01/01/23 2107   Placement Initiated 01/01/23 2107   Catheter Care Catheter/Wick replaced;Suction Canister/Tubing changed 01/01/23 2107   Perineal Care Yes 01/01/23 2107   Suction 40 mmgHg continuous 01/01/23 2107       Findings: Nonviable tissue     Electronically signed by Jan Vasquez DPM on 1/2/2023 at 6:24 PM the assessment and plan indicated by the resident.     Law Awan [facility] Ankle [facility] surgery Fellow

## 2023-01-02 NOTE — ANESTHESIA PRE PROCEDURE
Department of Anesthesiology  Preprocedure Note       Name:  Alma Rosa Membreno   Age:  69 y.o.  :  1955                                          MRN:  [id number]         Date:  2023      Surgeon: Cesilia Lorenz):  Kishor Rodriguez MD    Procedure: Procedure(s):  BILATERAL DEBRIDEMENT INCISION AND DRAINAGE OF FEET AND LEGS, RESECECTION OF ALL NONVIABLE TISSUE AND BONE    Medications prior to admission:   Prior to Admission medications    Medication Sig Start Date End Date Taking? Authorizing Provider   insulin glargine (LANTUS) 100 UNIT/ML injection vial Inject into the skin 2 times daily 45 in the morning and 35 at night    Historical Provider, MD   ipratropium-albuterol (DUONEB) 0.5-2.5 (3) MG/3ML SOLN nebulizer solution Take 3 mLs by nebulization every 6 hours as needed for Shortness of Breath 10/17/22   Haydee Gómez MD   budesonide (PULMICORT) 0.5 MG/2ML nebulizer suspension Take 2 mLs by nebulization 2 times daily 22   Haydee Gómez MD   MAGNESIUM-OXIDE 400 (241.3 Mg) MG TABS tablet TAKE 2 TABLETS BY MOUTH EVERY DAY 21   Historical Provider, MD   loratadine (CLARITIN) 10 MG capsule     Historical Provider, MD   digoxin (LANOXIN) 125 MCG tablet     Historical Provider, MD   dilTIAZem (CARDIZEM) 120 MG tablet Take 120 mg by mouth in the morning and 120 mg in the evening. Historical Provider, MD   docusate sodium (COLACE) 100 MG capsule Twice a Day PRN 19   Historical Provider, MD   apixaban (ELIQUIS) 5 MG TABS tablet     Historical Provider, MD   ferrous sulfate (IRON 325) 325 (65 Fe) MG tablet Daily 7/10/18   Historical Provider, MD   furosemide (LASIX) 40 MG tablet     Historical Provider, MD   metOLazone (ZAROXOLYN) 2.5 MG tablet     Historical Provider, MD   OXYGEN 4 L 7/10/18   Historical Provider, MD   Pirfenidone (ESBRIET) 801 MG TABS Take by mouth  Patient not taking: Reported on 2023    Historical Provider, MD   pravastatin (PRAVACHOL) 20 MG tablet Take 20 mg by mouth daily. Historical Provider, MD   canagliflozin (INVOKANA) 100 MG TABS tablet Take 100 mg by mouth every morning (before breakfast). Patient not taking: No sig reported    Historical Provider, MD   Cholecalciferol (VITAMIN D3) 31896 UNITS CAPS Take  by mouth every 7 days. Historical Provider, MD   POTASSIUM CITRATE PO Take  by mouth daily. Patient not taking: Reported on 1/1/2023    Historical Provider, MD   Glucosamine-Chondroitin (GLUCOSAMINE CHONDR COMPLEX PO) Take  by mouth daily. Patient not taking: No sig reported    Funmi Provider, MD   acetaminophen (TYLENOL) 500 MG tablet Take 500 mg by mouth every 6 hours as needed for Pain. Historical Provider, MD   insulin regular (HUMULIN R;NOVOLIN R) 100 UNIT/ML injection Inject  into the skin See Admin Instructions. Sliding scale as needed 150-200 5 units  201-250 10 units  251-300 15 units 301-350 17 units 351-400 20 units    Historical Provider, MD   hydrALAZINE (APRESOLINE) 50 MG tablet Take 50 mg by mouth 3 times daily. Instructed to take morning of surgery, 09/26/2012,with a sip of water  Patient not taking: No sig reported    Historical Provider, MD   gabapentin (NEURONTIN) 100 MG capsule Take 300 mg by mouth 3 times daily. Historical Provider, MD   levothyroxine (SYNTHROID) 200 MCG tablet Take 200 mcg by mouth daily. Instructed to take with sip water am of surgery, 09/26/2012    Historical Provider, MD   FLUoxetine (PROZAC) 20 MG capsule Take 20 mg by mouth 2 times daily. Historical Provider, MD   aspirin 81 MG chewable tablet Take 81 mg by mouth daily. Instructed lpn to contact dr hurt re: preop instructions    Historical Provider, MD   therapeutic multivitamin-minerals [facility]) tablet Take 1 tablet by mouth daily. Patient not taking: Reported on 5/24/2022    Historical Provider, MD   FISH OIL Take 1 capsule by mouth 2 times daily.  Contact dr hurt re: preop instructions  Patient not taking: No sig reported    Historical Provider, MD   insulin detemir (LEVEMIR) 100 UNIT/ML injection vial Inject 60 Units into the skin every morning (before breakfast) 60 units at 0700; 55 units  At 4 p.m. Historical Provider, MD       Current medications:    No current facility-administered medications for this visit. No current outpatient medications on file.      Facility-Administered Medications Ordered in Other Visits   Medication Dose Route Frequency Provider Last Rate Last Admin    apixaban (ELIQUIS) tablet 5 mg  5 mg Oral BID Eugenio Son MD   5 mg at 01/01/23 2154    aspirin chewable tablet 81 mg  81 mg Oral Daily Eugenio Son MD   81 mg at 01/01/23 2154    budesonide (PULMICORT) nebulizer suspension 500 mcg  0.5 mg Nebulization BID Eugenio Son MD   500 mcg at 01/02/23 0750    digoxin (LANOXIN) tablet 125 mcg  125 mcg Oral Daily Eugenio Son MD   125 mcg at 01/02/23 1508    dilTIAZem (CARDIZEM 12 HR) extended release capsule 120 mg  120 mg Oral 2 times per day Eugenio Son MD   120 mg at 01/01/23 2154    docusate sodium (COLACE) capsule 100 mg  100 mg Oral Daily Eugenio Son MD   100 mg at 01/01/23 2154    ferrous sulfate (IRON 325) tablet 325 mg  325 mg Oral Daily with breakfast Eugenio Son MD        FLUoxetine (PROZAC) capsule 20 mg  20 mg Oral BID Eugenio Son MD   20 mg at 01/01/23 2154    insulin glargine (LANTUS) injection vial 22 Units  22 Units SubCUTAneous QAM Eugenio Son MD        insulin glargine (LANTUS) injection vial 15 Units  15 Units SubCUTAneous Nightly Eugenio Son MD   15 Units at 01/01/23 2155    insulin lispro (HUMALOG) injection vial 0-4 Units  0-4 Units SubCUTAneous TID WC Eugenio Son MD        insulin lispro (HUMALOG) injection vial 0-4 Units  0-4 Units SubCUTAneous Nightly Eugenio Son MD        gabapentin (NEURONTIN) capsule 300 mg  300 mg Oral TID Eugenio Son MD   300 mg at 01/01/23 2154    ipratropium-albuterol (DUONEB) nebulizer solution 3 mL  1 vial Nebulization Q4H PRN Kassy Cam MD   3 mL at 01/01/23 2042    levothyroxine (SYNTHROID) tablet 200 mcg  200 mcg Oral Daily Kassy Cam MD        magnesium oxide (MAG-OX) tablet 400 mg  400 mg Oral Daily Kassy Cam MD   400 mg at 01/01/23 2155    metOLazone (ZAROXOLYN) tablet 2.5 mg  2.5 mg Oral Daily Kassy Cam MD   2.5 mg at 01/01/23 2154    Pirfenidone TABS 801 mg (Patient Supplied)  801 mg Oral Daily Kassy Cam MD        pravastatin (PRAVACHOL) tablet 20 mg  20 mg Oral Daily Kassy Cam MD   20 mg at 01/01/23 2154    sodium chloride flush 0.9 % injection 5-40 mL  5-40 mL IntraVENous 2 times per day Kassy Cam MD   10 mL at 01/01/23 2200    sodium chloride flush 0.9 % injection 5-40 mL  5-40 mL IntraVENous PRN Kassy Cam MD        0.9 % sodium chloride infusion   IntraVENous PRN Kassy Cam MD        ondansetron (ZOFRAN-ODT) disintegrating tablet 4 mg  4 mg Oral Q8H PRN Kassy Cam MD        Or    ondansetron [facility]) injection 4 mg  4 mg IntraVENous Q6H PRN Kassy Cam MD        polyethylene glycol [facility]) packet 17 g  17 g Oral Daily PRN Kassy Cam MD        acetaminophen (TYLENOL) tablet 650 mg  650 mg Oral Q6H PRN Kassy Cam MD        Or    acetaminophen (TYLENOL) suppository 650 mg  650 mg Rectal Q6H PRN Kassy Cam MD        0.9 % sodium chloride infusion   IntraVENous Continuous Kassy Cam MD 75 mL/hr at 01/02/23 1023 New Bag at 01/02/23 1023    glucose chewable tablet 16 g  4 tablet Oral PRN Kassy Cam MD        dextrose bolus 10% 125 mL  125 mL IntraVENous PRN Kassy Cam MD        Or    dextrose bolus 10% 250 mL  250 mL IntraVENous PRN Kassy Cam MD        glucagon (rDNA) injection 1 mg  1 mg SubCUTAneous PRN Kassy Cam MD        dextrose 10 % infusion   IntraVENous Continuous PRN Kassy Cam MD        miconazole (MICOTIN) 2 % powder   Topical BID Kong Montes DO   Given at 01/02/23 9655  white petrolatum ointment   Topical BID PRN Kong Montes DO   Given at 01/01/23 2350       Allergies:     Allergies   Allergen Reactions    Piperacillin Sod-Tazobactam So      Rash      Sulfa Antibiotics      rash    Vancomycin      Rash         Problem List:    Patient Active Problem List   Diagnosis Code    Diabetic infection of left foot ([facility] [address] E11.628, L08.9    Osteomyelitis ([facility] [address] M86.9    Diabetes mellitus ([facility] [address] E11.9    Diabetic peripheral neuropathy (HCC) E11.42    Hypothyroid E03.9    Hyperlipidemia with target LDL less than 100 E78.5    Hypertension I10    Thyroid disease E07.9    MRSA (methicillin resistant Staphylococcus aureus) A49.02    Hyperlipidemia E78.5    Neuropathy G62.9    Acid reflux K21.9    Pulmonary fibrosis (HCC) J84.10    Hypoxia R09.02    Chronic bronchitis (HCC) J42    Altered mental status, unspecified R41.82    UTI (urinary tract infection) N39.0    Encephalopathy G93.40    LAURIE (acute kidney injury) ([facility] [address] N17.9       Past Medical History:        Diagnosis Date    Acid reflux     Adult respiratory distress syndrome (HCC)     after surgery was trached and was reversed has been resolved    Arthritis     osteo    Arthritis     Blood transfusion     with colon surgery and 1st foot surgery    Bronchitis     Chronic knee pain     Chronic sinusitis     Colitis     COPD (chronic obstructive pulmonary disease) (HCC)     stable no medications    Depression     Diabetes mellitus (HCC)     blood sugars run high per pt    Diverticulitis     Gall stones     H/O chest x-ray     Hemorrhoids     Hyperlipidemia     Hypertension     stable per pt    Hypothyroidism     MRSA (methicillin resistant Staphylococcus aureus) 2007    in left foot  resolved    Neuropathy     Numbness     Obesity     Overweight(278.02)     PICC (peripherally inserted central catheter) in place 09/2012    right upper arm for present antibiotic therapy    SOBOE (shortness of breath on exertion)     Thyroid disease     Urinary anomaly frequency,leakage,urgency, UTI    Varicose veins     Wheezing        Past Surgical History:        Procedure Laterality Date    ANKLE FUSION      left    COLONOSCOPY      COLOSTOMY      due to rupture diverticuli    FOOT DEBRIDEMENT      serveral  left    FOOT DEBRIDEMENT  2012    Left heel bone debridment removal of staple application of wound vac    HAMMER TOE SURGERY      right    HARDWARE REMOVAL  2008    left foot    REVISION COLOSTOMY  2005 reversal    SKIN GRAFT      several left foot    TONSILLECTOMY      TRACHEOSTOMY      after surgery developed peritonitis and was trached       Social History:    Social History     Tobacco Use    Smoking status: Former     Packs/day: 2.00     Years: 30.00     Pack years: 60.00     Types: Cigarettes     Start date:      Quit date: 2004     Years since quittin.3    Smokeless tobacco: Never   Substance Use Topics    Alcohol use: Yes     Comment: rarely                                Counseling given: Not Answered      Vital Signs (Current): There were no vitals filed for this visit.                                            BP Readings from Last 3 Encounters:   23 (!) 156/69   22 110/60   21 102/64       NPO Status:                                                                                 BMI:   Wt Readings from Last 3 Encounters:   23 220 lb (99.8 kg)   23 213 lb (96.6 kg)   22 263 lb (119.3 kg)     There is no height or weight on file to calculate BMI.    CBC:   Lab Results   Component Value Date/Time    WBC 11.7 2023 02:51 AM    RBC 3.08 2023 02:51 AM    HGB 8.2 2023 02:51 AM    HCT 28.1 2023 02:51 AM    MCV 91.2 2023 02:51 AM    RDW 15.5 2023 02:51 AM     2023 02:51 AM       CMP:   Lab Results   Component Value Date/Time     2023 02:51 AM    K 4.0 2023 02:51 AM  01/02/2023 02:51 AM    CO2 31 01/02/2023 02:51 AM    BUN 49 01/02/2023 02:51 AM    CREATININE 2.6 01/02/2023 02:51 AM    AGRATIO 0.8 08/05/2022 12:41 PM    LABGLOM 19 01/02/2023 02:51 AM    GLUCOSE 160 01/02/2023 02:51 AM    GLUCOSE 335 06/07/2012 11:00 AM    PROT 6.8 01/01/2023 02:36 PM    CALCIUM 8.7 01/02/2023 02:51 AM    BILITOT 0.3 01/01/2023 02:36 PM    ALKPHOS 67 01/01/2023 02:36 PM    AST 11 01/01/2023 02:36 PM    ALT 8 01/01/2023 02:36 PM       POC Tests: No results for input(s): POCGLU, POCNA, POCK, POCCL, POCBUN, POCHEMO, POCHCT in the last 72 hours. Coags: No results found for: PROTIME, INR, APTT    HCG (If Applicable): No results found for: PREGTESTUR, PREGSERUM, HCG, HCGQUANT     ABGs: No results found for: PHART, PO2ART, ZZM5FZU, HSB9ZMX, BEART, [id number]     Type & Screen (If Applicable):  No results found for: LABABO, LABRH    Drug/Infectious Status (If Applicable):  No results found for: HIV, HEPCAB    COVID-19 Screening (If Applicable): No results found for: COVID19        Anesthesia Evaluation    Airway: Mallampati: II  TM distance: >3 FB   Neck ROM: full  Mouth opening: > = 3 FB   Dental:    (+) poor dentition      Pulmonary:normal exam    (+) COPD:  shortness of breath:                            ROS comment: Pulmonary fibrosis  PE comment: Patient requires 4 liters of O2 at her home daily.  Cardiovascular:    (+) hypertension:, dysrhythmias: atrial fibrillation, CANTU:,                ROS comment: A. Fib, RBBB on EKG     Neuro/Psych:   (+) neuromuscular disease:, psychiatric history:            GI/Hepatic/Renal:   (+) GERD:,           Endo/Other:    (+) Diabetes, hypothyroidism::., .                  ROS comment: Osteomyelitis   Acid reflux     Adult respiratory distress syndrome (HCC)      after surgery was trached and was reversed has been resolved   Arthritis      osteo   Arthritis     Blood transfusion      with colon surgery and 1st foot surgery   Bronchitis     Chronic knee pain     Chronic sinusitis     Colitis     COPD (chronic obstructive pulmonary disease) (HCC)      stable no medications   Depression     Diabetes mellitus (HCC)      blood sugars run high per pt   Diverticulitis     Gall stones     H/O chest x-ray     Hemorrhoids     Hyperlipidemia     Hypertension      stable per pt   Hypothyroidism     MRSA (methicillin resistant Staphylococcus aureus) 2007    in left foot  resolved   Neuropathy     Numbness     Obesity     Overweight(278.02)     PICC (peripherally inserted central catheter) in place 09/2012    right upper arm for present antibiotic therapy   SOBOE (shortness of breath on exertion)     Thyroid disease     Urinary anomaly frequency,leakage,urgency, UTI   Varicose veins     Wheezing          Abdominal:             Vascular:   + PE. Other Findings:             Anesthesia Plan      MAC     ASA 4       Induction: intravenous. MIPS: Postoperative opioids intended and Prophylactic antiemetics administered. Anesthetic plan and risks discussed with patient. Plan discussed with CRNA. H&P reviewed, patient examined, no changes  Patient agreed to undergo full resuscitation during the perioperative period.     Tamela Barros MD   1/2/2023
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
## 2023-01-02 NOTE — CONSULTS
Department of Podiatry   Consult Note        Reason for Consult:  wounds to right 1st and second toe and left shin    CHIEF COMPLAINT:  foot and leg wounds     HISTORY OF PRESENT ILLNESS:      Patient is a 69year old female that presents to the ED with an open wounds to her left leg and right 1st and second toes. Patient states that she does not know how she got these wounds. States that she has had an increase in confusion over the last few days. States that she has been seeing Dr. Rima Coulter for the wound that is on the lateral aspect of her left foot. Scheduled for bilateral foot and leg debridment 1/2/22. NPO at midnight. Patient denies any other pedal complaints. Patient denies any N/V/SOB/C/D.      Past Medical History:        Diagnosis Date    Acid reflux     Adult respiratory distress syndrome ([facility] [address]     after surgery was trached and was reversed has been resolved    Arthritis     osteo    Arthritis     Blood transfusion     with colon surgery and 1st foot surgery    Bronchitis     Chronic knee pain     Chronic sinusitis     Colitis     COPD (chronic obstructive pulmonary disease) ([facility])     stable no medications    Depression     Diabetes mellitus (HCC)     blood sugars run high per pt    Diverticulitis     Gall stones     H/O chest x-ray     Hemorrhoids     Hyperlipidemia     Hypertension     stable per pt    Hypothyroidism     MRSA (methicillin resistant Staphylococcus aureus) 2007    in left foot  resolved    Neuropathy     Numbness     Obesity     Overweight(278.02)     PICC (peripherally inserted central catheter) in place 09/2012    right upper arm for present antibiotic therapy    SOBOE (shortness of breath on exertion)     Thyroid disease     Urinary anomaly frequency,leakage,urgency, UTI    Varicose veins     Wheezing        Past Surgical History:        Procedure Laterality Date    ANKLE FUSION  2007    left    COLONOSCOPY      COLOSTOMY  2004    due to rupture diverticuli    FOOT DEBRIDEMENT serveral  left    FOOT DEBRIDEMENT  08/29/2012    Left heel bone debridment removal of staple application of wound vac    HAMMER TOE SURGERY      right    HARDWARE REMOVAL  2008    left foot    REVISION COLOSTOMY  2005 reversal    SKIN GRAFT      several left foot    TONSILLECTOMY      TRACHEOSTOMY  2004    after surgery developed peritonitis and was trached       Medications Prior to Admission:    Medications Prior to Admission: insulin glargine (LANTUS) 100 UNIT/ML injection vial, Inject into the skin 2 times daily 45 in the morning and 35 at night  ipratropium-albuterol (DUONEB) 0.5-2.5 (3) MG/3ML SOLN nebulizer solution, Take 3 mLs by nebulization every 6 hours as needed for Shortness of Breath  budesonide (PULMICORT) 0.5 MG/2ML nebulizer suspension, Take 2 mLs by nebulization 2 times daily  MAGNESIUM-OXIDE 400 (241.3 Mg) MG TABS tablet, TAKE 2 TABLETS BY MOUTH EVERY DAY  loratadine (CLARITIN) 10 MG capsule,   digoxin (LANOXIN) 125 MCG tablet,   dilTIAZem (CARDIZEM) 120 MG tablet, Take 120 mg by mouth in the morning and 120 mg in the evening. docusate sodium (COLACE) 100 MG capsule, Twice a Day PRN  apixaban (ELIQUIS) 5 MG TABS tablet,   ferrous sulfate (IRON 325) 325 (65 Fe) MG tablet, Daily  furosemide (LASIX) 40 MG tablet,   metOLazone (ZAROXOLYN) 2.5 MG tablet,   OXYGEN, 4 L  pravastatin (PRAVACHOL) 20 MG tablet, Take 20 mg by mouth daily. Cholecalciferol (VITAMIN D3) 58362 UNITS CAPS, Take  by mouth every 7 days. acetaminophen (TYLENOL) 500 MG tablet, Take 500 mg by mouth every 6 hours as needed for Pain. insulin regular (HUMULIN R;NOVOLIN R) 100 UNIT/ML injection, Inject  into the skin See Admin Instructions. Sliding scale as needed 150-200 5 units  201-250 10 units  251-300 15 units 301-350 17 units 351-400 20 units  gabapentin (NEURONTIN) 100 MG capsule, Take 300 mg by mouth 3 times daily. levothyroxine (SYNTHROID) 200 MCG tablet, Take 200 mcg by mouth daily.  Instructed to take with sip water am of surgery, 09/26/2012  FLUoxetine (PROZAC) 20 MG capsule, Take 20 mg by mouth 2 times daily. aspirin 81 MG chewable tablet, Take 81 mg by mouth daily. Instructed lpn to contact dr hurt re: preop instructions  insulin detemir (LEVEMIR) 100 UNIT/ML injection vial, Inject 60 Units into the skin every morning (before breakfast) 60 units at 0700; 55 units  At 4 p.m. Pirfenidone (ESBRIET) 801 MG TABS, Take by mouth (Patient not taking: Reported on 1/1/2023)  canagliflozin (INVOKANA) 100 MG TABS tablet, Take 100 mg by mouth every morning (before breakfast). (Patient not taking: No sig reported)  POTASSIUM CITRATE PO, Take  by mouth daily. (Patient not taking: Reported on 1/1/2023)  Glucosamine-Chondroitin (GLUCOSAMINE CHONDR COMPLEX PO), Take  by mouth daily. (Patient not taking: No sig reported)  hydrALAZINE (APRESOLINE) 50 MG tablet, Take 50 mg by mouth 3 times daily. Instructed to take morning of surgery, 09/26/2012,with a sip of water (Patient not taking: No sig reported)  therapeutic multivitamin-minerals (THERAGRAN-M) tablet, Take 1 tablet by mouth daily. (Patient not taking: Reported on 5/24/2022)  FISH OIL, Take 1 capsule by mouth 2 times daily. Contact dr hurt re: preop instructions (Patient not taking: No sig reported)    Allergies:  Piperacillin sod-tazobactam so, Sulfa antibiotics, and Vancomycin    Social History:   TOBACCO:   reports that she quit smoking about 18 years ago. Her smoking use included cigarettes. She started smoking about 52 years ago. She has a 60.00 pack-year smoking history. She has never used smokeless tobacco.  ETOH:   reports current alcohol use.   DRUGS:   Social History     Substance and Sexual Activity   Drug Use No       Family History:       Problem Relation Age of Onset    Prostate Cancer Father     Coronary Art Dis Mother     Diabetes Brother     Stroke Brother          REVIEW OF SYSTEMS:    All pertinent positives and negatives as noted in HPI       LOWER EXTREMITY EXAMINATION     VASCULAR:  DP and PT pulses are palpable. CFT < 5 seconds B/L. Warm to warm from the tibial tuberosity to the distal aspect of the digits dorsally. NEUROLOGIC:  Protective sensation is diminished     DERM:    - 1st and 2nd digit of the right foot wounds. Drainage noted to the wounds. Wound is scabbed over. No malodor noted. Edema and erythema noted to the digits. - Anterior left leg wound. Superficial in nature. Drainage noted to the wound. Wound measures 6cm x 3cm. Erythema and edema noted. Wound base is granular. - Left lateral wound noted. Wound measures 4 cm x 2 cm x 0.1 cm. Wound bed is fibrotic with a hyperkeratotic rim. No drainage, no malodor noted. Edema and erythema noted to the wound. MUSCULOSKELETAL: No deformities noted. 5/5 Gross Muscle strength in all 4 quadrants.                         CONSULTS:  IP CONSULT TO INFECTIOUS DISEASES  IP CONSULT TO PODIATRY    MEDICATION:  Scheduled Meds:   apixaban  5 mg Oral BID    aspirin  81 mg Oral Daily    budesonide  0.5 mg Nebulization BID    digoxin  125 mcg Oral Daily    dilTIAZem  120 mg Oral 2 times per day    docusate sodium  100 mg Oral Daily    [START ON 1/2/2023] ferrous sulfate  325 mg Oral Daily with breakfast    FLUoxetine  20 mg Oral BID    [START ON 1/2/2023] insulin glargine  22 Units SubCUTAneous QAM    insulin glargine  15 Units SubCUTAneous Nightly    insulin lispro  0-4 Units SubCUTAneous TID WC    insulin lispro  0-4 Units SubCUTAneous Nightly    gabapentin  300 mg Oral TID    [START ON 1/2/2023] levothyroxine  200 mcg Oral Daily    magnesium oxide  400 mg Oral Daily    metOLazone  2.5 mg Oral Daily    [START ON 1/2/2023] Pirfenidone  801 mg Oral Daily    pravastatin  20 mg Oral Daily    sodium chloride flush  5-40 mL IntraVENous 2 times per day     Continuous Infusions:   sodium chloride      sodium chloride      dextrose       PRN Meds:.ipratropium-albuterol, sodium chloride flush, sodium chloride, ondansetron **OR** ondansetron, polyethylene glycol, acetaminophen **OR** acetaminophen, glucose, dextrose bolus **OR** dextrose bolus, glucagon (rDNA), dextrose    RADIOLOGY:  XR FOOT LEFT (MIN 3 VIEWS)   Final Result   No fracture or dislocation. Significant osseous deformities again noted   involving the left foot, when compared to the previous study performed   07/13/2009 and without significant interval change. Patient is status post   interval amputation of the distal phalanx of the 2nd toe, along with a   portion of the middle phalanx. Interval osseous fusion of the bases of the   metatarsals, with the tarsal bones. Diffuse osteopenia again noted. Soft   tissue swelling diffusely about the foot and most prominently about the 2nd   toe. No evidence to suggest osteomyelitis. If osteomyelitis needs to be   excluded further, then MRI of the left foot with and without intravenous   gadolinium can be considered. XR TIBIA FIBULA LEFT (2 VIEWS)   Final Result   No acute osseous abnormality involving left tibia/fibula. XR FOOT RIGHT (MIN 3 VIEWS)   Final Result   1. Severe osteoarthritis involving interphalangeal joint of the 1st digit. 2. No obvious bony erosive changes to suggest osteomyelitis. No evidence of   subcutaneous gas.          MRI FOOT RIGHT WO CONTRAST    (Results Pending)   MRI FOOT LEFT WO CONTRAST    (Results Pending)   VL LOWER EXTREMITY ARTERIAL SEGMENTAL PRESSURES W PPG BILATERAL    (Results Pending)   US DUP LOWER EXTREMITY RIGHT RENAN    (Results Pending)   US DUP LOWER EXTREMITY LEFT RENAN    (Results Pending)       Vitals:    BP (!) 152/63   Pulse 68   Temp 98.4 °F (36.9 °C) (Oral)   Resp 20   Ht 5' 8\" (1.727 m)   Wt 263 lb (119.3 kg)   LMP 01/29/2005   SpO2 94%   BMI 39.99 kg/m²     LABS:   Recent Labs     01/01/23  1436   WBC 11.1   HGB 8.2*   HCT 26.7*        Recent Labs     01/01/23  1436      K 4.4   CL 99   CO2 29   BUN 48*   CREATININE 2.6*     Recent Labs 01/01/23  1436   PROT 6.8       ASSESSMENTS:   -Right 1st and 2nd digit wounds, POA   - Left anterior leg wound, POA   - Left lateral foot wound, POA   - Type II diabetes   - Hyperlipidemia     PLAN:    - Patient was examined and evaluated. Reviewed patient's recent lab results, charts and pertinent diagnostic imaging. Reviewed ancillary service notes. -WBC: 11.1   - Vascular: ordered and pending   - X-rays:   Left foot:  No fracture or dislocation. Significant osseous deformities again noted   involving the left foot, when compared to the previous study performed   07/13/2009 and without significant interval change. Patient is status post   interval amputation of the distal phalanx of the 2nd toe, along with a   portion of the middle phalanx. Interval osseous fusion of the bases of the   metatarsals, with the tarsal bones. Diffuse osteopenia again noted. Soft   tissue swelling diffusely about the foot and most prominently about the 2nd   toe. No evidence to suggest osteomyelitis. If osteomyelitis needs to be   excluded further, then MRI of the left foot with and without intravenous   gadolinium can be considered. Right Foot:   1. Severe osteoarthritis involving interphalangeal joint of the 1st digit. 2. No obvious bony erosive changes to suggest osteomyelitis. No evidence of   subcutaneous gas. MRI: Pending   Dressing: Opticell to left leg xeroform to right and left foot wounds and DSD.   -Scheduled for bilateral foot and leg debridement 1/2/22. -NPO at midnight   - Will continue to follow patient while they are in-house. - Discussed patient with Dr. Jaleesa Carrero      Thank you for the opportunity to take part in the patient's care. Please do not hesitate to call for any questions or concerns. Tom 111 PGY1  1/1/2023   10:39 PM       I agree with the assessment and plan indicated by the resident. Plan for surgery.     Law Muller [place]  [facility] surgery Fellow

## 2023-01-02 NOTE — CONSULTS
[address] [place] [address] [facility]  [facility]  Consultation Note     Admit Date: 1/1/2023  2:10 PM    Reason for Consult:   UTI and right thumb cellulitis    Attending Physician:  Patric Snyder DO    HISTORY OF PRESENT ILLNESS:             The history is obtained from extensive review of available past medical records. The patient is a 69 y.o. female who is previously known to the ID service. The patient says that she had altered mentation at home and was realizing that something was wrong so she managed to call her daughter who found her confused and with a new skin tear of her left anterior leg and abrasion with nail avulsion of the right first toe. She also has a painful right thumb that has no drainage. She also has an abrasion on the right middle finger that is not tender. She was brought to the ED at [facility] on 1/1/2023. On presentation she was afebrile. Vital signs were unremarkable. Urine and blood cultures were obtained. She received 1 dose of Ceftriaxone. Podiatry and orthopedic surgery were consulted. An MRI of the left foot and right foot were ordered.     Past Medical History:        Diagnosis Date    Acid reflux     Adult respiratory distress syndrome ([address]     after surgery was trached and was reversed has been resolved    Arthritis     osteo    Arthritis     Blood transfusion     with colon surgery and 1st foot surgery    Bronchitis     Chronic knee pain     Chronic sinusitis     Colitis     COPD (chronic obstructive pulmonary disease) (H[facility])     stable no medications    Depression     Diabetes mellitus (HCC)     blood sugars run high per pt    Diverticulitis     Gall stones     H/O chest x-ray     Hemorrhoids     Hyperlipidemia     Hypertension     stable per pt    Hypothyroidism     MRSA (methicillin resistant Staphylococcus aureus) 2007    in left foot  resolved    Neuropathy     Numbness     Obesity     Overweight(278.02)     PICC (peripherally inserted central catheter) in place 09/2012    right upper arm for present antibiotic therapy    SOBOE (shortness of breath on exertion)     Thyroid disease     Urinary anomaly frequency,leakage,urgency, UTI    Varicose veins     Wheezing      August 2012. Admitted to [facility] with a left diabetic foot infection. She underwent debridement and bone biopsy of the left heel and treated with Levofloxacin for a UTI. Seen by ID treated with Daptomycin and Doripenem x6 weeks. She was discharged to rehab.     Past Surgical History:        Procedure Laterality Date    ANKLE FUSION  2007    left    COLONOSCOPY      COLOSTOMY  2004    due to rupture diverticuli    FOOT DEBRIDEMENT      serveral  left    FOOT DEBRIDEMENT  08/29/2012    Left heel bone debridment removal of staple application of wound vac    HAMMER TOE SURGERY      right    HARDWARE REMOVAL  2008    left foot    REVISION COLOSTOMY  2005 reversal    SKIN GRAFT      several left foot    TONSILLECTOMY      TRACHEOSTOMY  2004    after surgery developed peritonitis and was trached     Current Medications:   Scheduled Meds:   apixaban  5 mg Oral BID    aspirin  81 mg Oral Daily    budesonide  0.5 mg Nebulization BID    digoxin  125 mcg Oral Daily    dilTIAZem  120 mg Oral 2 times per day    docusate sodium  100 mg Oral Daily    ferrous sulfate  325 mg Oral Daily with breakfast    FLUoxetine  20 mg Oral BID    insulin glargine  22 Units SubCUTAneous QAM    insulin glargine  15 Units SubCUTAneous Nightly    insulin lispro  0-4 Units SubCUTAneous TID WC    insulin lispro  0-4 Units SubCUTAneous Nightly    gabapentin  300 mg Oral TID    levothyroxine  200 mcg Oral Daily    magnesium oxide  400 mg Oral Daily    metOLazone  2.5 mg Oral Daily    Pirfenidone  801 mg Oral Daily    pravastatin  20 mg Oral Daily    sodium chloride flush  5-40 mL IntraVENous 2 times per day    miconazole   Topical BID     Continuous Infusions:   sodium chloride      sodium chloride 75 mL/hr at 01/01/23 9173 dextrose       PRN Meds:ipratropium-albuterol, sodium chloride flush, sodium chloride, ondansetron **OR** ondansetron, polyethylene glycol, acetaminophen **OR** acetaminophen, glucose, dextrose bolus **OR** dextrose bolus, glucagon (rDNA), dextrose, white petrolatum    Allergies:  Piperacillin sod-tazobactam so, Sulfa antibiotics, and Vancomycin    Social History:   Social History     Socioeconomic History    Marital status: Single   Tobacco Use    Smoking status: Former     Packs/day: 2.00     Years: 30.00     Pack years: 60.00     Types: Cigarettes     Start date:      Quit date: 2004     Years since quittin.3    Smokeless tobacco: Never   Substance and Sexual Activity    Alcohol use: Yes     Comment: rarely    Drug use: No      Pets: None  Travel: No  The patient lives alone    Family History:       Problem Relation Age of Onset    Prostate Cancer Father     Coronary Art Dis Mother     Diabetes Brother     Stroke Brother    . Otherwise non-pertinent to the chief complaint. REVIEW OF SYSTEMS:    Constitutional: Negative for fevers, chills, diaphoresis  Neurologic: Negative   Psychiatric: Negative  Rheumatologic: Negative   Endocrine: Diabetes  Hematologic: Negative  Immunologic: Negative  ENT: Negative  Respiratory: Negative   Cardiovascular: Negative  GI: Negative  : Negative  Musculoskeletal: As in the HPI  Skin: No rashes. PHYSICAL EXAM:    Vitals:   /64   Pulse 56   Temp 98.2 °F (36.8 °C) (Oral)   Resp 18   Ht 5' 8\" (1.727 m)   Wt 220 lb (99.8 kg)   LMP 2005   SpO2 96%   BMI 33.45 kg/m²   Constitutional: The patient is awake, alert, and oriented. Sitting up in a chair. She is awake and alert. She recognized me from 10 years ago. Skin: Warm and dry. No rashes were noted. HEENT: Eyes show round, and reactive pupils. No jaundice. Moist mucous membranes, no ulcerations, no thrush. Neck: Supple to movements. No lymphadenopathy.    Chest: No use of accessory muscles to breathe. Symmetrical expansion. Auscultation reveals no wheezing, crackles, or rhonchi. Cardiovascular: S1 and S2 are rhythmic and regular. No murmurs appreciated. Abdomen: Positive bowel sounds to auscultation. Benign to palpation. No masses felt. No hepatosplenomegaly. Extremities: There is an ulcer on the lateral aspect of the left foot. It is clean and not infected. There is an abrasion on the left anterior leg. It does also clean and not infected. There is an avulsion of the right first toe nail and some abrasion of the first and second toes. No evidence of infection. The right thumb is pink. There is some desquamation. No drainage. It is thickened. Abrasion right middle finger.   It is not infected  Lines: Peripheral.    CBC+dif:  Recent Labs     01/01/23  1436 01/02/23  0251   WBC 11.1 11.7*   HGB 8.2* 8.2*   HCT 26.7* 28.1*   MCV 89.3 91.2    307   NEUTROABS 9.38* 9.69*     Lab Results   Component Value Date    CRP 1.0 (H) 08/31/2012    CRP 1.9 (H) 08/29/2012    CRP 3.4 (H) 06/07/2012      No results found for: CRPHS  Lab Results   Component Value Date    SEDRATE 36 (H) 08/31/2012    SEDRATE 34 (H) 08/29/2012    SEDRATE 27 (H) 05/10/2012     Lab Results   Component Value Date    ALT 8 01/01/2023    AST 11 01/01/2023    ALKPHOS 67 01/01/2023    BILITOT 0.3 01/01/2023     Lab Results   Component Value Date/Time     01/02/2023 02:51 AM    K 4.0 01/02/2023 02:51 AM     01/02/2023 02:51 AM    CO2 31 01/02/2023 02:51 AM    BUN 49 01/02/2023 02:51 AM    CREATININE 2.6 01/02/2023 02:51 AM    AGRATIO 0.8 08/05/2022 12:41 PM    LABGLOM 19 01/02/2023 02:51 AM    GLUCOSE 160 01/02/2023 02:51 AM    GLUCOSE 335 06/07/2012 11:00 AM    PROT 6.8 01/01/2023 02:36 PM    LABALBU 3.1 01/01/2023 02:36 PM    LABALBU 3.7 06/07/2012 11:00 AM    CALCIUM 8.7 01/02/2023 02:51 AM    BILITOT 0.3 01/01/2023 02:36 PM    ALKPHOS 67 01/01/2023 02:36 PM    AST 11 01/01/2023 02:36 PM    ALT 8 01/01/2023 02:36 PM       No results found for: PROTIME, INR    Lab Results   Component Value Date/Time    TSH 0.59 08/05/2022 12:41 PM       Lab Results   Component Value Date/Time    COLORU Yellow 01/01/2023 03:34 PM    PHUR 6.0 01/01/2023 03:34 PM    WBCUA >20 01/01/2023 03:34 PM    RBCUA 10-20 01/01/2023 03:34 PM    BACTERIA MODERATE 01/01/2023 03:34 PM    CLARITYU Clear 01/01/2023 03:34 PM    SPECGRAV 1.010 01/01/2023 03:34 PM    LEUKOCYTESUR LARGE 01/01/2023 03:34 PM    UROBILINOGEN 0.2 01/01/2023 03:34 PM    BILIRUBINUR Negative 01/01/2023 03:34 PM    BLOODU LARGE 01/01/2023 03:34 PM    GLUCOSEU Negative 01/01/2023 03:34 PM    AMORPHOUS FEW 01/01/2023 03:34 PM       No results found for: HCO3, BE, O2SAT, PH, THGB, PCO2, PO2, TCO2  Radiology:  Reviewed    Microbiology:  Pending  No results for input(s): BC in the last 72 hours. No results for input(s): ORG in the last 72 hours. No results for input(s): Audrey Berg in the last 72 hours. No results for input(s): STREPNEUMAGU in the last 72 hours. No results for input(s): LP1UAG in the last 72 hours. No results for input(s): ASO in the last 72 hours. No results for input(s): CULTRESP in the last 72 hours. No results for input(s): PROCAL in the last 72 hours. Assessment:  Multiple abrasions left leg, right foot and right middle finger. These are not infected  Possible osteomyelitis versus paronychia right thumb  Possible UTI  Mild leukocytosis    Plan:    I do not think further antibiotic treatment is warranted  Check cultures, baseline ESR, CRP  Orthopedic surgery to see and decide if she needs surgery in her right thumb  Podiatry following  Will follow with you    Thank you for having us see this patient in consultation. I will be discussing this case with the treating physicians. Spoke with nursing.     John Wynn MD  9:34 AM  1/2/2023

## 2023-01-02 NOTE — ANESTHESIA PRE PROCEDURE
Department of Anesthesiology  Preprocedure Note       Name:  Bonny Moscoso   Age:  69 y.o.  :  1955                                          MRN:  [id number]         Date:  2023      Surgeon: [facility]):  Reginaldo Rodriguez MD    Procedure: Procedure(s):  BILATERAL DEBRIDEMENT INCISION AND DRAINAGE OF FEET AND LEGS, RESECECTION OF ALL NONVIABLE TISSUE AND BONE    Medications prior to admission:   Prior to Admission medications    Medication Sig Start Date End Date Taking? Authorizing Provider   insulin glargine (LANTUS) 100 UNIT/ML injection vial Inject into the skin 2 times daily 45 in the morning and 35 at night    Historical Provider, MD   ipratropium-albuterol (DUONEB) 0.5-2.5 (3) MG/3ML SOLN nebulizer solution Take 3 mLs by nebulization every 6 hours as needed for Shortness of Breath 10/17/22   Yefri Kelly MD   budesonide (PULMICORT) 0.5 MG/2ML nebulizer suspension Take 2 mLs by nebulization 2 times daily 22   Yefri Kelly MD   MAGNESIUM-OXIDE 400 (241.3 Mg) MG TABS tablet TAKE 2 TABLETS BY MOUTH EVERY DAY 21   Historical Provider, MD   loratadine (CLARITIN) 10 MG capsule     Historical Provider, MD   digoxin (LANOXIN) 125 MCG tablet     Historical Provider, MD   dilTIAZem (CARDIZEM) 120 MG tablet Take 120 mg by mouth in the morning and 120 mg in the evening. Historical Provider, MD   docusate sodium (COLACE) 100 MG capsule Twice a Day PRN 19   Historical Provider, MD   apixaban (ELIQUIS) 5 MG TABS tablet     Historical Provider, MD   ferrous sulfate (IRON 325) 325 (65 Fe) MG tablet Daily 7/10/18   Historical Provider, MD   furosemide (LASIX) 40 MG tablet     Historical Provider, MD   metOLazone (ZAROXOLYN) 2.5 MG tablet     Historical Provider, MD   OXYGEN 4 L 7/10/18   Historical Provider, MD   Pirfenidone (ESBRIET) 801 MG TABS Take by mouth  Patient not taking: Reported on 2023    Historical Provider, MD   pravastatin (PRAVACHOL) 20 MG tablet Take 20 mg by mouth daily. Historical Provider, MD   canagliflozin (INVOKANA) 100 MG TABS tablet Take 100 mg by mouth every morning (before breakfast). Patient not taking: No sig reported    Historical Provider, MD   Cholecalciferol (VITAMIN D3) 23764 UNITS CAPS Take  by mouth every 7 days. Historical Provider, MD   POTASSIUM CITRATE PO Take  by mouth daily. Patient not taking: Reported on 1/1/2023    Historical Provider, MD   Glucosamine-Chondroitin (GLUCOSAMINE CHONDR COMPLEX PO) Take  by mouth daily. Patient not taking: No sig reported    Funmi Provider, MD   acetaminophen (TYLENOL) 500 MG tablet Take 500 mg by mouth every 6 hours as needed for Pain. Historical Provider, MD   insulin regular (HUMULIN R;NOVOLIN R) 100 UNIT/ML injection Inject  into the skin See Admin Instructions. Sliding scale as needed 150-200 5 units  201-250 10 units  251-300 15 units 301-350 17 units 351-400 20 units    Historical Provider, MD   hydrALAZINE (APRESOLINE) 50 MG tablet Take 50 mg by mouth 3 times daily. Instructed to take morning of surgery, 09/26/2012,with a sip of water  Patient not taking: No sig reported    Historical Provider, MD   gabapentin (NEURONTIN) 100 MG capsule Take 300 mg by mouth 3 times daily. Historical Provider, MD   levothyroxine (SYNTHROID) 200 MCG tablet Take 200 mcg by mouth daily. Instructed to take with sip water am of surgery, 09/26/2012    Historical Provider, MD   FLUoxetine (PROZAC) 20 MG capsule Take 20 mg by mouth 2 times daily. Historical Provider, MD   aspirin 81 MG chewable tablet Take 81 mg by mouth daily. Instructed lpn to contact dr hurt re: preop instructions    Historical Provider, MD   therapeutic multivitamin-minerals [facility]) tablet Take 1 tablet by mouth daily. Patient not taking: Reported on 5/24/2022    Historical Provider, MD   FISH OIL Take 1 capsule by mouth 2 times daily.  Contact dr hurt re: preop instructions  Patient not taking: No sig reported    Historical Provider, MD   insulin detemir (LEVEMIR) 100 UNIT/ML injection vial Inject 60 Units into the skin every morning (before breakfast) 60 units at 0700; 55 units  At 4 p.m. Historical Provider, MD       Current medications:    No current facility-administered medications for this visit. No current outpatient medications on file.      Facility-Administered Medications Ordered in Other Visits   Medication Dose Route Frequency Provider Last Rate Last Admin    apixaban (ELIQUIS) tablet 5 mg  5 mg Oral BID Renee Bañuelos MD   5 mg at 01/01/23 2154    aspirin chewable tablet 81 mg  81 mg Oral Daily Renee Bañuelos MD   81 mg at 01/01/23 2154    budesonide (PULMICORT) nebulizer suspension 500 mcg  0.5 mg Nebulization BID Renee Bañuelos MD   500 mcg at 01/02/23 0750    digoxin (LANOXIN) tablet 125 mcg  125 mcg Oral Daily Renee Bañuelos MD   125 mcg at 01/02/23 1508    dilTIAZem (CARDIZEM 12 HR) extended release capsule 120 mg  120 mg Oral 2 times per day Renee Bañuelos MD   120 mg at 01/01/23 2154    docusate sodium (COLACE) capsule 100 mg  100 mg Oral Daily Renee Bañuelos MD   100 mg at 01/01/23 2154    ferrous sulfate (IRON 325) tablet 325 mg  325 mg Oral Daily with breakfast Renee Bañuelos MD        FLUoxetine (PROZAC) capsule 20 mg  20 mg Oral BID Renee Bañuelos MD   20 mg at 01/01/23 2154    insulin glargine (LANTUS) injection vial 22 Units  22 Units SubCUTAneous QAM Renee Bañuelos MD        insulin glargine (LANTUS) injection vial 15 Units  15 Units SubCUTAneous Nightly Renee Bañuelos MD   15 Units at 01/01/23 2155    insulin lispro (HUMALOG) injection vial 0-4 Units  0-4 Units SubCUTAneous TID WC Renee Bañuelos MD        insulin lispro (HUMALOG) injection vial 0-4 Units  0-4 Units SubCUTAneous Nightly Renee Bañuelos MD        gabapentin (NEURONTIN) capsule 300 mg  300 mg Oral TID Renee Bañuelos MD   300 mg at 01/01/23 2154    ipratropium-albuterol (DUONEB) nebulizer solution 3 mL  1 vial Nebulization Q4H PRN Sarthak Aviles MD   3 mL at 01/01/23 2042    levothyroxine (SYNTHROID) tablet 200 mcg  200 mcg Oral Daily Sarthak Aviles MD        magnesium oxide (MAG-OX) tablet 400 mg  400 mg Oral Daily Sarthak Aviles MD   400 mg at 01/01/23 2155    metOLazone (ZAROXOLYN) tablet 2.5 mg  2.5 mg Oral Daily Sarthak Aviles MD   2.5 mg at 01/01/23 2154    Pirfenidone TABS 801 mg (Patient Supplied)  801 mg Oral Daily Sarthak Aviles MD        pravastatin (PRAVACHOL) tablet 20 mg  20 mg Oral Daily Sarthak Aviles MD   20 mg at 01/01/23 2154    sodium chloride flush 0.9 % injection 5-40 mL  5-40 mL IntraVENous 2 times per day Sarthak Aviles MD   10 mL at 01/01/23 2200    sodium chloride flush 0.9 % injection 5-40 mL  5-40 mL IntraVENous PRN Sarthak Aviles MD        0.9 % sodium chloride infusion   IntraVENous PRN Sarthak Aviles MD        ondansetron (ZOFRAN-ODT) disintegrating tablet 4 mg  4 mg Oral Q8H PRN Sarthak Aviles MD        Or    ondansetron [facility]) injection 4 mg  4 mg IntraVENous Q6H PRN Sarthak Aviles MD        polyethylene glycol [facility]) packet 17 g  17 g Oral Daily PRN Sarthak Aviles MD        acetaminophen (TYLENOL) tablet 650 mg  650 mg Oral Q6H PRN Sarthak Aviles MD        Or    acetaminophen (TYLENOL) suppository 650 mg  650 mg Rectal Q6H PRN Sarthak Aviles MD        0.9 % sodium chloride infusion   IntraVENous Continuous Sarthak Aviles MD 75 mL/hr at 01/02/23 1023 New Bag at 01/02/23 1023    glucose chewable tablet 16 g  4 tablet Oral PRN Sarthak Aviles MD        dextrose bolus 10% 125 mL  125 mL IntraVENous PRN Sarthak Aviles MD        Or    dextrose bolus 10% 250 mL  250 mL IntraVENous PRN Sarthak Aviles MD        glucagon (rDNA) injection 1 mg  1 mg SubCUTAneous PRN Sarthak Aviles MD        dextrose 10 % infusion   IntraVENous Continuous PRN Sarthak Aviles MD        miconazole (MICOTIN) 2 % powder   Topical BID Kong Montes DO   Given at 01/02/23 9345  white petrolatum ointment   Topical BID PRN Kong Montes DO   Given at 01/01/23 2350       Allergies:     Allergies   Allergen Reactions    Piperacillin Sod-Tazobactam So      Rash      Sulfa Antibiotics      rash    Vancomycin      Rash         Problem List:    Patient Active Problem List   Diagnosis Code    Diabetic infection of left foot ([facility] [address] E11.628, L08.9    Osteomyelitis ([facility] [address] M86.9    Diabetes mellitus ([facility] [address] E11.9    Diabetic peripheral neuropathy (HCC) E11.42    Hypothyroid E03.9    Hyperlipidemia with target LDL less than 100 E78.5    Hypertension I10    Thyroid disease E07.9    MRSA (methicillin resistant Staphylococcus aureus) A49.02    Hyperlipidemia E78.5    Neuropathy G62.9    Acid reflux K21.9    Pulmonary fibrosis (HCC) J84.10    Hypoxia R09.02    Chronic bronchitis (HCC) J42    Altered mental status, unspecified R41.82    UTI (urinary tract infection) N39.0    Encephalopathy G93.40    LAURIE (acute kidney injury) ([facility] [address] N17.9       Past Medical History:        Diagnosis Date    Acid reflux     Adult respiratory distress syndrome (HCC)     after surgery was trached and was reversed has been resolved    Arthritis     osteo    Arthritis     Blood transfusion     with colon surgery and 1st foot surgery    Bronchitis     Chronic knee pain     Chronic sinusitis     Colitis     COPD (chronic obstructive pulmonary disease) (HCC)     stable no medications    Depression     Diabetes mellitus (HCC)     blood sugars run high per pt    Diverticulitis     Gall stones     H/O chest x-ray     Hemorrhoids     Hyperlipidemia     Hypertension     stable per pt    Hypothyroidism     MRSA (methicillin resistant Staphylococcus aureus) 2007    in left foot  resolved    Neuropathy     Numbness     Obesity     Overweight(278.02)     PICC (peripherally inserted central catheter) in place 09/2012    right upper arm for present antibiotic therapy    SOBOE (shortness of breath on exertion)     Thyroid disease     Urinary anomaly frequency,leakage,urgency, UTI    Varicose veins     Wheezing        Past Surgical History:        Procedure Laterality Date    ANKLE FUSION      left    COLONOSCOPY      COLOSTOMY      due to rupture diverticuli    FOOT DEBRIDEMENT      serveral  left    FOOT DEBRIDEMENT  2012    Left heel bone debridment removal of staple application of wound vac    HAMMER TOE SURGERY      right    HARDWARE REMOVAL  2008    left foot    REVISION COLOSTOMY  2005 reversal    SKIN GRAFT      several left foot    TONSILLECTOMY      TRACHEOSTOMY      after surgery developed peritonitis and was trached       Social History:    Social History     Tobacco Use    Smoking status: Former     Packs/day: 2.00     Years: 30.00     Pack years: 60.00     Types: Cigarettes     Start date:      Quit date: 2004     Years since quittin.3    Smokeless tobacco: Never   Substance Use Topics    Alcohol use: Yes     Comment: rarely                                Counseling given: Not Answered      Vital Signs (Current): There were no vitals filed for this visit.                                            BP Readings from Last 3 Encounters:   23 (!) 156/69   22 110/60   21 102/64       NPO Status:                                                                                 BMI:   Wt Readings from Last 3 Encounters:   23 220 lb (99.8 kg)   23 213 lb (96.6 kg)   22 263 lb (119.3 kg)     There is no height or weight on file to calculate BMI.    CBC:   Lab Results   Component Value Date/Time    WBC 11.7 2023 02:51 AM    RBC 3.08 2023 02:51 AM    HGB 8.2 2023 02:51 AM    HCT 28.1 2023 02:51 AM    MCV 91.2 2023 02:51 AM    RDW 15.5 2023 02:51 AM     2023 02:51 AM       CMP:   Lab Results   Component Value Date/Time     2023 02:51 AM    K 4.0 2023 02:51 AM  01/02/2023 02:51 AM    CO2 31 01/02/2023 02:51 AM    BUN 49 01/02/2023 02:51 AM    CREATININE 2.6 01/02/2023 02:51 AM    AGRATIO 0.8 08/05/2022 12:41 PM    LABGLOM 19 01/02/2023 02:51 AM    GLUCOSE 160 01/02/2023 02:51 AM    GLUCOSE 335 06/07/2012 11:00 AM    PROT 6.8 01/01/2023 02:36 PM    CALCIUM 8.7 01/02/2023 02:51 AM    BILITOT 0.3 01/01/2023 02:36 PM    ALKPHOS 67 01/01/2023 02:36 PM    AST 11 01/01/2023 02:36 PM    ALT 8 01/01/2023 02:36 PM       POC Tests: No results for input(s): POCGLU, POCNA, POCK, POCCL, POCBUN, POCHEMO, POCHCT in the last 72 hours.     Coags: No results found for: PROTIME, INR, APTT    HCG (If Applicable): No results found for: PREGTESTUR, PREGSERUM, HCG, HCGQUANT     ABGs: No results found for: PHART, PO2ART, LAZ9FIP, KTO3LNR, BEART, F1BKCTNI     Type & Screen (If Applicable):  No results found for: LABABO, LABRH    Drug/Infectious Status (If Applicable):  No results found for: HIV, HEPCAB    COVID-19 Screening (If Applicable): No results found for: COVID19        Anesthesia Evaluation    Airway:           Dental:          Pulmonary:   (+) COPD:  shortness of breath:                            ROS comment: Pulmonary fibrosis   Cardiovascular:    (+) hypertension:, dysrhythmias: atrial fibrillation, CANTU:,                ROS comment: A. Fib, RBBB on EKG     Neuro/Psych:   (+) neuromuscular disease:, psychiatric history:            GI/Hepatic/Renal:   (+) GERD:,           Endo/Other:    (+) Diabetes, hypothyroidism::., .                  ROS comment: Osteomyelitis   Acid reflux     Adult respiratory distress syndrome ([facility] [address]      after surgery was trached and was reversed has been resolved   Arthritis      osteo   Arthritis     Blood transfusion      with colon surgery and 1st foot surgery   Bronchitis     Chronic knee pain     Chronic sinusitis     Colitis     COPD (chronic obstructive pulmonary disease) (HCC)      stable no medications   Depression     Diabetes mellitus ([address]      blood sugars run high per pt   Diverticulitis     Gall stones     H/O chest x-ray     Hemorrhoids     Hyperlipidemia     Hypertension      stable per pt   Hypothyroidism     MRSA (methicillin resistant Staphylococcus aureus) 2007    in left foot  resolved   Neuropathy     Numbness     Obesity     Overweight(278.02)     PICC (peripherally inserted central catheter) in place 09/2012    right upper arm for present antibiotic therapy   SOBOE (shortness of breath on exertion)     Thyroid disease     Urinary anomaly frequency,leakage,urgency, UTI   Varicose veins     Wheezing          Abdominal:             Vascular:   + PE. Other Findings:             Anesthesia Plan      general     ASA 4       Induction: intravenous.                     H&P reviewed, patient examined, no changes        Mely Haynes MD   1/2/2023

## 2023-01-02 NOTE — CONSULTS
Ortho consult    CC right hand pain    Reason for consult right thumb fx    Requested by Dr. Elidia Cam    Pain score 5/10    History of Present Illness:   Joy Quinonez  is a 69 y.o. female patient of Reginaldo Patel MD  with a pertinent PMHx of diabetes, HTN, COPD, pulmonary fibrosis, CKD, history of PE, who presented to the ED from home with chief complaint of confusion, right toe and left shin injury. Patient states that over the past 2-3 days she has had increased confusion. She this morning she woke up with an opened blister on her left leg as well as a great toe injury of her right foot. She is uncertain how she acquired those injuries. She denies any increased urinary frequency, urinary burning, fevers, or chills. She injured her right thumb about a month ago. She burned her right hand about a week ago. She has pain with activities. Less pain at rest.  Pain is an ache. In the ED vitals were notable for blood pressure 138/54. Labs were notable for creatinine 2.6 with baseline 2.0, troponin 97 and 97, WBC 11.1, Hgb 8.2 baseline 11. Urinalysis shows large blood, nitrite positive, large leukocyte esterase, and moderate bacteria. Blood and urine cultures were drawn. Xrays of the feet, and left tibia/fibula did not show any acute process. She received 1,000 mg Rocephin.       Past Medical History        Past Medical History:   Diagnosis Date    Acid reflux      Adult respiratory distress syndrome ([facility] [address]       after surgery was trached and was reversed has been resolved    Arthritis       osteo    Arthritis      Blood transfusion       with colon surgery and 1st foot surgery    Bronchitis      Chronic knee pain      Chronic sinusitis      Colitis      COPD (chronic obstructive pulmonary disease) ([facility])       stable no medications    Depression      Diabetes mellitus (HCC)       blood sugars run high per pt    Diverticulitis      Gall stones      H/O chest x-ray      Hemorrhoids      Hyperlipidemia Hypertension       stable per pt    Hypothyroidism      MRSA (methicillin resistant Staphylococcus aureus) 2007     in left foot  resolved    Neuropathy      Numbness      Obesity      Overweight(278.02)      PICC (peripherally inserted central catheter) in place 09/2012     right upper arm for present antibiotic therapy    SOBOE (shortness of breath on exertion)      Thyroid disease      Urinary anomaly frequency,leakage,urgency, UTI    Varicose veins      Wheezing                 Past Surgical History         Past Surgical History:   Procedure Laterality Date    ANKLE FUSION   2007     left    COLONOSCOPY        COLOSTOMY   2004     due to rupture diverticuli    FOOT DEBRIDEMENT         serveral  left    FOOT DEBRIDEMENT   08/29/2012     Left heel bone debridment removal of staple application of wound vac    HAMMER TOE SURGERY         right    HARDWARE REMOVAL   2008     left foot    REVISION COLOSTOMY   2005 reversal    SKIN GRAFT         several left foot    TONSILLECTOMY        TRACHEOSTOMY   2004     after surgery developed peritonitis and was trached            Medications Prior to Admission:    Home Medications           Prior to Admission medications    Medication Sig Start Date End Date Taking?  Authorizing Provider   ipratropium-albuterol (DUONEB) 0.5-2.5 (3) MG/3ML SOLN nebulizer solution Take 3 mLs by nebulization every 6 hours as needed for Shortness of Breath 10/17/22   Yes Nereida Govea MD   budesonide (PULMICORT) 0.5 MG/2ML nebulizer suspension Take 2 mLs by nebulization 2 times daily 5/9/22   Yes Nereida Govea MD   MAGNESIUM-OXIDE 400 (241.3 Mg) MG TABS tablet TAKE 2 TABLETS BY MOUTH EVERY DAY 9/17/21   Yes Historical Provider, MD   loratadine (CLARITIN) 10 MG capsule       Yes Historical Provider, MD   digoxin (LANOXIN) 125 MCG tablet       Yes Historical Provider, MD   dilTIAZem (CARDIZEM) 120 MG tablet       Yes Historical Provider, MD   docusate sodium (COLACE) 100 MG capsule Twice a Day PRN 5/24/19   Yes Historical Provider, MD   apixaban (ELIQUIS) 5 MG TABS tablet       Yes Historical Provider, MD   ferrous sulfate (IRON 325) 325 (65 Fe) MG tablet Daily 7/10/18   Yes Historical Provider, MD   furosemide (LASIX) 40 MG tablet       Yes Historical Provider, MD   metOLazone (ZAROXOLYN) 2.5 MG tablet       Yes Historical Provider, MD   OXYGEN 4 L 7/10/18   Yes Historical Provider, MD   pravastatin (PRAVACHOL) 20 MG tablet Take 20 mg by mouth daily. Yes Historical Provider, MD   Cholecalciferol (VITAMIN D3) 71071 UNITS CAPS Take  by mouth every 7 days. Yes Historical Provider, MD   acetaminophen (TYLENOL) 500 MG tablet Take 500 mg by mouth every 6 hours as needed for Pain. Yes Historical Provider, MD   insulin regular (HUMULIN R;NOVOLIN R) 100 UNIT/ML injection Inject  into the skin See Admin Instructions. Sliding scale as needed 150-200 5 units  201-250 10 units  251-300 15 units 301-350 17 units 351-400 20 units     Yes Historical Provider, MD   gabapentin (NEURONTIN) 100 MG capsule Take 300 mg by mouth 3 times daily. Yes Historical Provider, MD   levothyroxine (SYNTHROID) 200 MCG tablet Take 200 mcg by mouth daily. Instructed to take with sip water am of surgery, 09/26/2012     Yes Historical Provider, MD   FLUoxetine (PROZAC) 20 MG capsule Take 20 mg by mouth 2 times daily. Yes Historical Provider, MD   aspirin 81 MG chewable tablet Take 81 mg by mouth daily. Instructed lpn to contact dr Valentino Carolina re: preop instructions     Yes Historical Provider, MD   insulin detemir (LEVEMIR) 100 UNIT/ML injection vial Inject 60 Units into the skin every morning (before breakfast) 60 units at 0700; 55 units  At 4 p.m. Yes Historical Provider, MD   Pirfenidone (ESBRIET) 801 MG TABS Take by mouth       Historical Provider, MD   canagliflozin (INVOKANA) 100 MG TABS tablet Take 100 mg by mouth every morning (before breakfast).   Patient not taking: No sig reported       Historical Provider, MD POTASSIUM CITRATE PO Take  by mouth daily. Historical Provider, MD   Glucosamine-Chondroitin (GLUCOSAMINE CHONDR COMPLEX PO) Take  by mouth daily. Patient not taking: No sig reported       Historical Provider, MD   hydrALAZINE (APRESOLINE) 50 MG tablet Take 50 mg by mouth 3 times daily. Instructed to take morning of surgery, 09/26/2012,with a sip of water  Patient not taking: No sig reported       Historical Provider, MD   therapeutic multivitamin-minerals (THERAGRAN-M) tablet Take 1 tablet by mouth daily. Patient not taking: Reported on 5/24/2022       Historical Provider, MD   FISH OIL Take 1 capsule by mouth 2 times daily. Contact dr hurt re: preop instructions  Patient not taking: No sig reported       Historical Provider, MD            Allergies:   Piperacillin sod-tazobactam so, Sulfa antibiotics, and Vancomycin     Social History:    reports that she quit smoking about 18 years ago. Her smoking use included cigarettes. She started smoking about 52 years ago. She has a 60.00 pack-year smoking history. She has never used smokeless tobacco. She reports current alcohol use. She reports that she does not use drugs. Family History:   family history includes Coronary Art Dis in her mother; Diabetes in her brother; Prostate Cancer in her father; Stroke in her brother. ROS:   Review of Systems   Constitutional:  Negative for chills and fever. Respiratory:  Positive for shortness of breath (baseline). Negative for cough. Cardiovascular:  Negative for chest pain and palpitations. Gastrointestinal:  Negative for constipation and diarrhea. Genitourinary:  Negative for dyspareunia and dysuria. Skin:  Positive for wound. Cellulitis of right thumb, trauma to right foot, and left shin    Neurological:  Negative for headaches. Psychiatric/Behavioral:  Positive for confusion.        Physical Exam:     Vitals:  BP (!) 138/54   Pulse 57   Temp 98 °F (36.7 °C) (Oral)   Resp 17   Ht 5' 8\" (1.727 m)   Wt 263 lb (119.3 kg)   LMP 01/29/2005   SpO2 98%   BMI 39.99 kg/m²      Physical Exam  Constitutional:       Appearance: Normal appearance. She is normal weight. HENT:      Head: Normocephalic and atraumatic. Mouth/Throat:      Mouth: Mucous membranes are moist.      Pharynx: Oropharynx is clear. Eyes:      Extraocular Movements: Extraocular movements intact. Pupils: Pupils are equal, round, and reactive to light. Cardiovascular:      Rate and Rhythm: Normal rate and regular rhythm. Pulses: Normal pulses. Heart sounds: No murmur heard. Pulmonary:      Effort: Pulmonary effort is normal.      Breath sounds: Normal breath sounds. No wheezing. Abdominal:      General: Abdomen is flat. Bowel sounds are normal. There is no distension. Tenderness: There is no abdominal tenderness. Hernia: A hernia (large reducable hernia in LUQ) is present. Musculoskeletal:      Right lower leg: No edema. Left lower leg: No edema. Skin:     General: Skin is warm and dry. Coloration: Skin is not jaundiced. Comments: Cellultis of right thumb with erythema, trauma to right great toe, 3x6 cm open blister on left shin    Neurological:      General: No focal deficit present. Mental Status: She is alert and oriented to person, place, and time.             Labs:  Recent Results         Recent Results (from the past 24 hour(s))   Lactic Acid     Collection Time: 01/01/23  2:32 PM   Result Value Ref Range     Lactic Acid 0.9 0.5 - 2.2 mmol/L   POCT Glucose     Collection Time: 01/01/23  2:33 PM   Result Value Ref Range     Meter Glucose 134 (H) 74 - 99 mg/dL   CBC with Auto Differential     Collection Time: 01/01/23  2:36 PM   Result Value Ref Range     WBC 11.1 4.5 - 11.5 E9/L     RBC 2.99 (L) 3.50 - 5.50 E12/L     Hemoglobin 8.2 (L) 11.5 - 15.5 g/dL     Hematocrit 26.7 (L) 34.0 - 48.0 %     MCV 89.3 80.0 - 99.9 fL     MCH 27.4 26.0 - 35.0 pg     MCHC 30.7 (L) 32.0 - 34.5 %     RDW 15.2 (H) 11.5 - 15.0 fL     Platelets 861 614 - 786 E9/L     MPV 11.1 7.0 - 12.0 fL     Neutrophils % 84.2 (H) 43.0 - 80.0 %     Immature Granulocytes % 1.0 0.0 - 5.0 %     Lymphocytes % 7.1 (L) 20.0 - 42.0 %     Monocytes % 5.9 2.0 - 12.0 %     Eosinophils % 1.4 0.0 - 6.0 %     Basophils % 0.4 0.0 - 2.0 %     Neutrophils Absolute 9.38 (H) 1.80 - 7.30 E9/L     Immature Granulocytes # 0.11 E9/L     Lymphocytes Absolute 0.79 (L) 1.50 - 4.00 E9/L     Monocytes Absolute 0.66 0.10 - 0.95 E9/L     Eosinophils Absolute 0.16 0.05 - 0.50 E9/L     Basophils Absolute 0.04 0.00 - 0.20 E9/L   Comprehensive Metabolic Panel w/ Reflex to MG     Collection Time: 01/01/23  2:36 PM   Result Value Ref Range     Sodium 140 132 - 146 mmol/L     Potassium reflex Magnesium 4.4 3.5 - 5.0 mmol/L     Chloride 99 98 - 107 mmol/L     CO2 29 22 - 29 mmol/L     Anion Gap 12 7 - 16 mmol/L     Glucose 126 (H) 74 - 99 mg/dL     BUN 48 (H) 6 - 23 mg/dL     Creatinine 2.6 (H) 0.5 - 1.0 mg/dL     Est, Glom Filt Rate 19 >=60 mL/min/1.73     Calcium 8.7 8.6 - 10.2 mg/dL     Total Protein 6.8 6.4 - 8.3 g/dL     Albumin 3.1 (L) 3.5 - 5.2 g/dL     Total Bilirubin 0.3 0.0 - 1.2 mg/dL     Alkaline Phosphatase 67 35 - 104 U/L     ALT 8 0 - 32 U/L     AST 11 0 - 31 U/L   Troponin     Collection Time: 01/01/23  2:36 PM   Result Value Ref Range     Troponin, High Sensitivity 97 (H) 0 - 9 ng/L   EKG 12 Lead     Collection Time: 01/01/23  2:36 PM   Result Value Ref Range     Ventricular Rate 75 BPM     Atrial Rate 97 BPM     QRS Duration 166 ms     Q-T Interval 420 ms     QTc Calculation (Bazett) 469 ms     R Axis 95 degrees     T Axis -65 degrees   Urinalysis with Microscopic     Collection Time: 01/01/23  3:34 PM   Result Value Ref Range     Color, UA Yellow Straw/Yellow     Clarity, UA Clear Clear     Glucose, Ur Negative Negative mg/dL     Bilirubin Urine Negative Negative     Ketones, Urine Negative Negative mg/dL     Specific [place], UA 1.010 1.005 - 1.030     Blood, Urine LARGE (A) Negative     pH, UA 6.0 5.0 - 9.0     Protein,  (A) Negative mg/dL     Urobilinogen, Urine 0.2 <2.0 E.U./dL     Nitrite, Urine POSITIVE (A) Negative     Leukocyte Esterase, Urine LARGE (A) Negative     WBC, UA >20 (A) 0 - 5 /HPF     RBC, UA 10-20 (A) 0 - 2 /HPF     Bacteria, UA MODERATE (A) None Seen /HPF     Amorphous, UA FEW     Troponin     Collection Time: 01/01/23  3:34 PM   Result Value Ref Range     Troponin, High Sensitivity 97 (H) 0 - 9 ng/L            XR FOOT LEFT (MIN 3 VIEWS)   Final Result   No fracture or dislocation. Significant osseous deformities again noted   involving the left foot, when compared to the previous study performed   07/13/2009 and without significant interval change. Patient is status post   interval amputation of the distal phalanx of the 2nd toe, along with a   portion of the middle phalanx. Interval osseous fusion of the bases of the   metatarsals, with the tarsal bones. Diffuse osteopenia again noted. Soft   tissue swelling diffusely about the foot and most prominently about the 2nd   toe. No evidence to suggest osteomyelitis. If osteomyelitis needs to be   excluded further, then MRI of the left foot with and without intravenous   gadolinium can be considered. XR TIBIA FIBULA LEFT (2 VIEWS)   Final Result   No acute osseous abnormality involving left tibia/fibula. XR FOOT RIGHT (MIN 3 VIEWS)   Final Result   1. Severe osteoarthritis involving interphalangeal joint of the 1st digit. 2. No obvious bony erosive changes to suggest osteomyelitis. No evidence of   subcutaneous gas. Assessment/Plan:             Active Hospital Problems     Diagnosis Date Noted    Altered mental status, unspecified [R41.82] 01/01/2023       Priority: Medium      Altered Mental Status likely secondary to Urinary Tract Infection  Received 1,000 mg Rocephin in ER.   Urine culture pending  Continue Rocephin 1,000 for 5 days  CBC and BMP daily   Normal saline 75 cc/hr     Right foot injury  Consult Podiatry and wound care     COPD  On 4L oxygen at home  Pulmicort BID     CKD  Baseline Cr 2.0, 2.6 today  Continue to monitor      Type 2 Diabetes  At home is on lantus 45 in the morning and 30 at night as well as sliding scale. A1c 8/22 6.2. Lantus 22 in morning, 15 at night   HDSSI   Hypoglycemia protocol     Atrial Fibrillation on Eliquis  Digoxin 125 mcg daily  Cardizem 120 mg daily  Eliquis 5mg BID       Diabetic Neuropathy   Gabapentin 300 mg TID      Hypomagnesemia  Magnesium oxide 400 mg daily       HLD  Pravastatin 20 mg daily      CAD  Aspirin 81 mg daily      Lymphedema  Metolazone 2.5 mg twice a week      Pulmonary Fibrosis  Esbriet 267 TID      History of PE  Eliquis 5mg BID       Depression   Prozac 20 mg BID     Hypothyroidism  Synthroid      DVT ppx: On eliquis   Code Status: Eryn Aldrich MD   Family Medicine Resident Physician PGY-1  1/1/2023  HOSPITALIST ATTENDING PHYSICIAN NOTE 1/1/2023 2144PM:     Details of the evaluation - subjective assessment (including medication profile, past medical, family and social history when applicable), examination, review of lab and test data, diagnostic impressions and medical decision making - performed by Sanford Magdaleno MD, were discussed with me on the date of service and I agree with clinical information herein unless otherwise noted. The patient has been evaluated by me personally earlier today. Pt reports no fevers, chills,n/v. PHYSICAL EXAM:     Vitals:  BP (!) 152/63   Pulse 68   Temp 98.4 °F (36.9 °C) (Oral)   Resp 20   Ht 5' 8\" (1.727 m)   Wt 263 lb (119.3 kg)   LMP 01/29/2005   SpO2 94%   BMI 39.99 kg/m²      General:  Appears comfortable. Answers questions appropriately most of the time and cooperative with exam  HEENT:  Mucous membranes moist. No erythema, rhinorrhea, or post-nasal drip noted.   Neck:  No carotid bruits. Heart:  Rhythm regular at rate of 70  Lungs:  CTA. No wheeze, rales, or rhonchi  Abdomen:  Positive bowel sounds positive. Soft. Non-tender. No guarding, rebound or rigidity. Breast/Rectal/Genitourinary: not pertinent. Extremities:  Negative for lower extremity edema  Skin:  Warm and dry. Areas of trauma on LE. Appears to have mild to moderate erythema, swelling and increased tem of LLE distal skin tear. Pt has disturbed anatomy of right thumb  Vascular: 2/4 Dorsalis Pedis pulses bilaterally. Neuro:  Cranial nerves 2-12 grossly intact, no focal weakness or change in sensation noted. Extraocular muscles intact. Pupils equal, round, reactive to light. Right arm - burn scab to middle and ring fingers. Swelling to thumb. TTP thumb. No bone exposed. Sensation and motor intact. Left arm and both legs - ROM intact. Legs in bandages. Sensation and motor grossly intact. Left arm perfused. Xray - right thumb distal phalanx fracture    68 yo female right thumb distal phalanx fracture    Plan    NWB right thumb  Brace ordered for closed treatment of right thumb distal phalanx fracture  No plan for surgery  Rec wound care for finger bowman  Follow up with Maximino Modi in 2 weeks for fracture  Can follow up with wound care or PCP for finger burns      Of note, greater than 50 minutes were spent on the floor and with the patient performing a history and physical, reviewing labs and imaging, and discussing plan. Half the time was spent counseling and coordinating care.

## 2023-01-02 NOTE — PROGRESS NOTES
After the completion of the left foot MRI,we were changing the equipment to start the right foot. The pt,at this time,stated that she could not continue. I informed her of the status of the exam and the needs of it. She said again that she did not want to continue. I called the charge RN and informed her. She stated to send the pt back to the floor.

## 2023-01-02 NOTE — ANESTHESIA PRE PROCEDURE
Department of Anesthesiology  Preprocedure Note       Name:  Latoya Cuevas   Age:  69 y.o.  :  1955                                          MRN:  [id number]         Date:  2023      Surgeon: Radha Marie):  Luis Daniel Contreras MD    Procedure: Procedure(s):  BILATERAL DEBRIDEMENT INCISION AND DRAINAGE OF FEET AND LEGS, RESECECTION OF ALL NONVIABLE TISSUE AND BONE    Medications prior to admission:   Prior to Admission medications    Medication Sig Start Date End Date Taking? Authorizing Provider   insulin glargine (LANTUS) 100 UNIT/ML injection vial Inject into the skin 2 times daily 45 in the morning and 35 at night   Yes Historical Provider, MD   ipratropium-albuterol (DUONEB) 0.5-2.5 (3) MG/3ML SOLN nebulizer solution Take 3 mLs by nebulization every 6 hours as needed for Shortness of Breath 10/17/22  Yes Abida Cortez MD   budesonide (PULMICORT) 0.5 MG/2ML nebulizer suspension Take 2 mLs by nebulization 2 times daily 22  Yes Abida Cortez MD   MAGNESIUM-OXIDE 400 (241.3 Mg) MG TABS tablet TAKE 2 TABLETS BY MOUTH EVERY DAY 21  Yes Historical Provider, MD   loratadine (CLARITIN) 10 MG capsule    Yes Historical Provider, MD   digoxin (LANOXIN) 125 MCG tablet    Yes Historical Provider, MD   dilTIAZem (CARDIZEM) 120 MG tablet Take 120 mg by mouth in the morning and 120 mg in the evening. Yes Historical Provider, MD   docusate sodium (COLACE) 100 MG capsule Twice a Day PRN 19  Yes Historical Provider, MD   apixaban (ELIQUIS) 5 MG TABS tablet    Yes Historical Provider, MD   ferrous sulfate (IRON 325) 325 (65 Fe) MG tablet Daily 7/10/18  Yes Historical Provider, MD   furosemide (LASIX) 40 MG tablet    Yes Historical Provider, MD   metOLazone (ZAROXOLYN) 2.5 MG tablet    Yes Historical Provider, MD   OXYGEN 4 L 7/10/18  Yes Historical Provider, MD   pravastatin (PRAVACHOL) 20 MG tablet Take 20 mg by mouth daily.    Yes Historical Provider, MD   Cholecalciferol (VITAMIN D3) 17655 UNITS CAPS Take  by mouth every 7 days. Yes Historical Provider, MD   acetaminophen (TYLENOL) 500 MG tablet Take 500 mg by mouth every 6 hours as needed for Pain. Yes Historical Provider, MD   insulin regular (HUMULIN R;NOVOLIN R) 100 UNIT/ML injection Inject  into the skin See Admin Instructions. Sliding scale as needed 150-200 5 units  201-250 10 units  251-300 15 units 301-350 17 units 351-400 20 units   Yes Historical Provider, MD   gabapentin (NEURONTIN) 100 MG capsule Take 300 mg by mouth 3 times daily. Yes Historical Provider, MD   levothyroxine (SYNTHROID) 200 MCG tablet Take 200 mcg by mouth daily. Instructed to take with sip water am of surgery, 09/26/2012   Yes Historical Provider, MD   FLUoxetine (PROZAC) 20 MG capsule Take 20 mg by mouth 2 times daily. Yes Historical Provider, MD   aspirin 81 MG chewable tablet Take 81 mg by mouth daily. Instructed lpn to contact dr Rishi Fung re: preop instructions   Yes Historical Provider, MD   insulin detemir (LEVEMIR) 100 UNIT/ML injection vial Inject 60 Units into the skin every morning (before breakfast) 60 units at 0700; 55 units  At 4 p.m. Yes Historical Provider, MD   Pirfenidone (ESBRIET) 801 MG TABS Take by mouth  Patient not taking: Reported on 1/1/2023    Historical Provider, MD   canagliflozin (INVOKANA) 100 MG TABS tablet Take 100 mg by mouth every morning (before breakfast). Patient not taking: No sig reported    Historical Provider, MD   POTASSIUM CITRATE PO Take  by mouth daily. Patient not taking: Reported on 1/1/2023    Historical Provider, MD   Glucosamine-Chondroitin (GLUCOSAMINE CHONDR COMPLEX PO) Take  by mouth daily. Patient not taking: No sig reported    Historical Provider, MD   hydrALAZINE (APRESOLINE) 50 MG tablet Take 50 mg by mouth 3 times daily.  Instructed to take morning of surgery, 09/26/2012,with a sip of water  Patient not taking: No sig reported    Historical Provider, MD   therapeutic multivitamin-minerals (THERAGRAN-M) tablet Take 1 tablet by mouth daily. Patient not taking: Reported on 5/24/2022    Historical Provider, MD   FISH OIL Take 1 capsule by mouth 2 times daily.  Contact dr hurt re: preop instructions  Patient not taking: No sig reported    Historical Provider, MD       Current medications:    Current Facility-Administered Medications   Medication Dose Route Frequency Provider Last Rate Last Admin    apixaban (ELIQUIS) tablet 5 mg  5 mg Oral BID Winifred Robertson MD   5 mg at 01/01/23 2154    aspirin chewable tablet 81 mg  81 mg Oral Daily Winifred Robertson MD   81 mg at 01/01/23 2154    budesonide (PULMICORT) nebulizer suspension 500 mcg  0.5 mg Nebulization BID Winifred Robertson MD   500 mcg at 01/02/23 0750    digoxin (LANOXIN) tablet 125 mcg  125 mcg Oral Daily Winifred Robertson MD   125 mcg at 01/01/23 2154    dilTIAZem (CARDIZEM 12 HR) extended release capsule 120 mg  120 mg Oral 2 times per day Winifred Robertson MD   120 mg at 01/01/23 2154    docusate sodium (COLACE) capsule 100 mg  100 mg Oral Daily Winifred Robertson MD   100 mg at 01/01/23 2154    ferrous sulfate (IRON 325) tablet 325 mg  325 mg Oral Daily with breakfast Winifred Robertson MD        FLUoxetine (PROZAC) capsule 20 mg  20 mg Oral BID Winifred Robertson MD   20 mg at 01/01/23 2154    insulin glargine (LANTUS) injection vial 22 Units  22 Units SubCUTAneous QAM Winifred Robertson MD        insulin glargine (LANTUS) injection vial 15 Units  15 Units SubCUTAneous Nightly Winifred Robertson MD   15 Units at 01/01/23 2155    insulin lispro (HUMALOG) injection vial 0-4 Units  0-4 Units SubCUTAneous TID WC Winifred Robertson MD        insulin lispro (HUMALOG) injection vial 0-4 Units  0-4 Units SubCUTAneous Nightly Winifred Robertson MD        gabapentin (NEURONTIN) capsule 300 mg  300 mg Oral TID Winifred Robertson MD   300 mg at 01/01/23 2154    ipratropium-albuterol (DUONEB) nebulizer solution 3 mL  1 vial Nebulization Q4H PRN Winifred Robertson MD   3 mL at 01/01/23 2042  levothyroxine (SYNTHROID) tablet 200 mcg  200 mcg Oral Daily Abimael Loya MD        magnesium oxide (MAG-OX) tablet 400 mg  400 mg Oral Daily Abimael Loya MD   400 mg at 01/01/23 2155    metOLazone (ZAROXOLYN) tablet 2.5 mg  2.5 mg Oral Daily Abimael Loya MD   2.5 mg at 01/01/23 2154    Pirfenidone TABS 801 mg (Patient Supplied)  801 mg Oral Daily Abimael Loya MD        pravastatin (PRAVACHOL) tablet 20 mg  20 mg Oral Daily Abimael Loya MD   20 mg at 01/01/23 2154    sodium chloride flush 0.9 % injection 5-40 mL  5-40 mL IntraVENous 2 times per day Abimael Loya MD   10 mL at 01/01/23 2200    sodium chloride flush 0.9 % injection 5-40 mL  5-40 mL IntraVENous PRN Abimael Loya MD        0.9 % sodium chloride infusion   IntraVENous PRN Abimael Loya MD        ondansetron (ZOFRAN-ODT) disintegrating tablet 4 mg  4 mg Oral Q8H PRN Abimale Loya MD        Or    ondansetron [facility]) injection 4 mg  4 mg IntraVENous Q6H PRN Abimael Loya MD        polyethylene glycol [facility]) packet 17 g  17 g Oral Daily PRN Abimael Loya MD        acetaminophen (TYLENOL) tablet 650 mg  650 mg Oral Q6H PRN Abimael Loya MD        Or    acetaminophen (TYLENOL) suppository 650 mg  650 mg Rectal Q6H PRN Abimael Loya MD        0.9 % sodium chloride infusion   IntraVENous Continuous Abimael Loya MD 75 mL/hr at 01/02/23 1023 New Bag at 01/02/23 1023    glucose chewable tablet 16 g  4 tablet Oral PRN Abimael Loya MD        dextrose bolus 10% 125 mL  125 mL IntraVENous PRN Abimael Loya MD        Or    dextrose bolus 10% 250 mL  250 mL IntraVENous PRN Abimael Loya MD        glucagon (rDNA) injection 1 mg  1 mg SubCUTAneous PRN Abimael Loya MD        dextrose 10 % infusion   IntraVENous Continuous PRN Abimael Loya MD        miconazole (MICOTIN) 2 % powder   Topical BID Kong Hric, DO   Given at 01/02/23 0844    white petrolatum ointment   Topical BID PRN Kong Montes DO Given at 01/01/23 2350       Allergies:     Allergies   Allergen Reactions    Piperacillin Sod-Tazobactam So      Rash      Sulfa Antibiotics      rash    Vancomycin      Rash         Problem List:    Patient Active Problem List   Diagnosis Code    Diabetic infection of left foot ([facility] E11.628, L08.9    [facility] M86.9    Diabetes mellitus ([facility] E11.9    Diabetic peripheral neuropathy ([facility] E11.42    Hypothyroid E03.9    Hyperlipidemia with target LDL less than 100 E78.5    Hypertension I10    Thyroid disease E07.9    MRSA (methicillin resistant Staphylococcus aureus) A49.02    Hyperlipidemia E78.5    Neuropathy G62.9    Acid reflux K21.9    Pulmonary fibrosis (HCC) J84.10    Hypoxia R09.02    Chronic bronchitis (HCC) J42    Altered mental status, unspecified R41.82    UTI (urinary tract infection) N39.0    Encephalopathy G93.40    LAURIE (acute kidney injury) ([facility] N17.9       Past Medical History:        Diagnosis Date    Acid reflux     Adult respiratory distress syndrome (HCC)     after surgery was trached and was reversed has been resolved    Arthritis     osteo    Arthritis     Blood transfusion     with colon surgery and 1st foot surgery    Bronchitis     Chronic knee pain     Chronic sinusitis     Colitis     COPD (chronic obstructive pulmonary disease) (HCC)     stable no medications    Depression     Diabetes mellitus (HCC)     blood sugars run high per pt    Diverticulitis     Gall stones     H/O chest x-ray     Hemorrhoids     Hyperlipidemia     Hypertension     stable per pt    Hypothyroidism     MRSA (methicillin resistant Staphylococcus aureus) 2007    in left foot  resolved    Neuropathy     Numbness     Obesity     Overweight(278.02)     PICC (peripherally inserted central catheter) in place 09/2012    right upper arm for present antibiotic therapy    SOBOE (shortness of breath on exertion)     Thyroid disease     Urinary anomaly frequency,leakage,urgency, UTI    Varicose veins     Wheezing        Past Surgical History:        Procedure Laterality Date    ANKLE FUSION  2007    left    COLONOSCOPY      COLOSTOMY      due to rupture diverticuli    FOOT DEBRIDEMENT      serveral  left    FOOT DEBRIDEMENT  2012    Left heel bone debridment removal of staple application of wound vac    HAMMER TOE SURGERY      right    HARDWARE REMOVAL  2008    left foot    REVISION COLOSTOMY  2005 reversal    SKIN GRAFT      several left foot    TONSILLECTOMY      TRACHEOSTOMY      after surgery developed peritonitis and was trached       Social History:    Social History     Tobacco Use    Smoking status: Former     Packs/day: 2.00     Years: 30.00     Pack years: 60.00     Types: Cigarettes     Start date:      Quit date: 2004     Years since quittin.3    Smokeless tobacco: Never   Substance Use Topics    Alcohol use: Yes     Comment: rarely                                Counseling given: Not Answered      Vital Signs (Current):   Vitals:    23 1845 23 2041 23 0114 23 0703   BP: (!) 152/63   139/64   Pulse: 68   56   Resp: 20   18   Temp: 98.4 °F (36.9 °C)   98.2 °F (36.8 °C)   TempSrc: Oral   Oral   SpO2: 95% 94%  96%   Weight:   220 lb (99.8 kg)    Height:                                                  BP Readings from Last 3 Encounters:   23 139/64   22 110/60   21 102/64       NPO Status: Time of last liquid consumption:                         Time of last solid consumption:                         Date of last liquid consumption: 23                        Date of last solid food consumption: 23    BMI:   Wt Readings from Last 3 Encounters:   23 220 lb (99.8 kg)   23 213 lb (96.6 kg)   22 263 lb (119.3 kg)     Body mass index is 33.45 kg/m².     CBC:   Lab Results   Component Value Date/Time    WBC 11.7 2023 02:51 AM    RBC 3.08 01/02/2023 02:51 AM    HGB 8.2 01/02/2023 02:51 AM    HCT 28.1 01/02/2023 02:51 AM    MCV 91.2 01/02/2023 02:51 AM    RDW 15.5 01/02/2023 02:51 AM     01/02/2023 02:51 AM       CMP:   Lab Results   Component Value Date/Time     01/02/2023 02:51 AM    K 4.0 01/02/2023 02:51 AM     01/02/2023 02:51 AM    CO2 31 01/02/2023 02:51 AM    BUN 49 01/02/2023 02:51 AM    CREATININE 2.6 01/02/2023 02:51 AM    AGRATIO 0.8 08/05/2022 12:41 PM    LABGLOM 19 01/02/2023 02:51 AM    GLUCOSE 160 01/02/2023 02:51 AM    GLUCOSE 335 06/07/2012 11:00 AM    PROT 6.8 01/01/2023 02:36 PM    CALCIUM 8.7 01/02/2023 02:51 AM    BILITOT 0.3 01/01/2023 02:36 PM    ALKPHOS 67 01/01/2023 02:36 PM    AST 11 01/01/2023 02:36 PM    ALT 8 01/01/2023 02:36 PM       POC Tests: No results for input(s): POCGLU, POCNA, POCK, POCCL, POCBUN, POCHEMO, POCHCT in the last 72 hours.     Coags: No results found for: PROTIME, INR, APTT    HCG (If Applicable): No results found for: PREGTESTUR, PREGSERUM, HCG, HCGQUANT     ABGs: No results found for: PHART, PO2ART, QKQ5UVJ, MKH9SYL, BEART, [id number]     Type & Screen (If Applicable):  No results found for: LABABO, LABRH    Drug/Infectious Status (If Applicable):  No results found for: HIV, HEPCAB    COVID-19 Screening (If Applicable): No results found for: COVID19        Anesthesia Evaluation    Airway:           Dental:          Pulmonary:   (+) COPD:  shortness of breath:                            ROS comment: Pulmonary fibrosis   Cardiovascular:    (+) hypertension:, dysrhythmias: atrial fibrillation, CANTU:,                ROS comment: A. Fib, RBBB on EKG     Neuro/Psych:   (+) neuromuscular disease:, psychiatric history:            GI/Hepatic/Renal:   (+) GERD:,           Endo/Other:    (+) Diabetes, hypothyroidism::., .                  ROS comment: Osteomyelitis   Acid reflux     Adult respiratory distress syndrome ([facility]      after surgery was trached and was reversed has been resolved   Arthritis      osteo   Arthritis     Blood transfusion      with colon surgery and 1st foot surgery   Bronchitis     Chronic knee pain     Chronic sinusitis     Colitis     COPD (chronic obstructive pulmonary disease) (HCC)      stable no medications   Depression     Diabetes mellitus (HCC)      blood sugars run high per pt   Diverticulitis     Gall stones     H/O chest x-ray     Hemorrhoids     Hyperlipidemia     Hypertension      stable per pt   Hypothyroidism     MRSA (methicillin resistant Staphylococcus aureus) 2007    in left foot  resolved   Neuropathy     Numbness     Obesity     Overweight(278.02)     PICC (peripherally inserted central catheter) in place 09/2012    right upper arm for present antibiotic therapy   SOBOE (shortness of breath on exertion)     Thyroid disease     Urinary anomaly frequency,leakage,urgency, UTI   Varicose veins     Wheezing          Abdominal:             Vascular:   + PE. Other Findings:           Anesthesia Plan      general     ASA 4       Induction: intravenous.                             Roverto Geronimo MD   1/2/2023

## 2023-01-02 NOTE — ANESTHESIA POSTPROCEDURE EVALUATION
Department of Anesthesiology  Postprocedure Note    Patient: Joy Quinonez  MRN: [id number]  YOB: 1955  Date of evaluation: 1/2/2023      Procedure Summary     Date: 01/02/23 Room / Location: [facility]    Anesthesia Start: 7293 Anesthesia Stop: 1824    Procedure: BILATERAL DEBRIDEMENT INCISION AND DRAINAGE OF FEET AND LEGS, RESECECTION OF ALL NONVIABLE TISSUE AND BONE (Bilateral) Diagnosis:       Pain      (Pain [R52])    Surgeons: Lito Sánchez MD Responsible Provider: Etelvina Brennan MD    Anesthesia Type: MAC ASA Status: 4          Anesthesia Type: MAC    Sobeida Phase I:      Sobeida Phase II:        Anesthesia Post Evaluation    Patient location during evaluation: PACU  Patient participation: complete - patient participated  Level of consciousness: awake and alert  Pain score: 0  Airway patency: patent  Nausea & Vomiting: no nausea and no vomiting  Complications: no  Cardiovascular status: hemodynamically stable  Respiratory status: acceptable  Hydration status: stable

## 2023-01-03 ENCOUNTER — APPOINTMENT (OUTPATIENT)
Dept: ULTRASOUND IMAGING | Age: 68
DRG: 673 | End: 2023-01-03
Payer: MEDICARE

## 2023-01-03 LAB
ANION GAP SERPL CALCULATED.3IONS-SCNC: 11 MMOL/L (ref 7–16)
ANISOCYTOSIS: ABNORMAL
BASOPHILS ABSOLUTE: 0.04 E9/L (ref 0–0.2)
BASOPHILS RELATIVE PERCENT: 0.4 % (ref 0–2)
BUN BLDV-MCNC: 44 MG/DL (ref 6–23)
CALCIUM SERPL-MCNC: 8.4 MG/DL (ref 8.6–10.2)
CHLORIDE BLD-SCNC: 102 MMOL/L (ref 98–107)
CHLORIDE URINE RANDOM: 34 MMOL/L
CO2: 27 MMOL/L (ref 22–29)
CREAT SERPL-MCNC: 2.5 MG/DL (ref 0.5–1)
CREATININE URINE: 126 MG/DL (ref 29–226)
CREATININE URINE: 129 MG/DL (ref 29–226)
EOSINOPHILS ABSOLUTE: 0.22 E9/L (ref 0.05–0.5)
EOSINOPHILS RELATIVE PERCENT: 2.2 % (ref 0–6)
GFR SERPL CREATININE-BSD FRML MDRD: 20 ML/MIN/1.73
GLUCOSE BLD-MCNC: 132 MG/DL (ref 74–99)
GRAM STAIN ORDERABLE: NORMAL
GRAM STAIN ORDERABLE: NORMAL
HCT VFR BLD CALC: 29.1 % (ref 34–48)
HEMOGLOBIN: 8.3 G/DL (ref 11.5–15.5)
IMMATURE GRANULOCYTES #: 0.08 E9/L
IMMATURE GRANULOCYTES %: 0.8 % (ref 0–5)
LYMPHOCYTES ABSOLUTE: 0.7 E9/L (ref 1.5–4)
LYMPHOCYTES RELATIVE PERCENT: 7 % (ref 20–42)
MAGNESIUM: 2.3 MG/DL (ref 1.6–2.6)
MCH RBC QN AUTO: 26.7 PG (ref 26–35)
MCHC RBC AUTO-ENTMCNC: 28.5 % (ref 32–34.5)
MCV RBC AUTO: 93.6 FL (ref 80–99.9)
METER GLUCOSE: 106 MG/DL (ref 74–99)
METER GLUCOSE: 134 MG/DL (ref 74–99)
METER GLUCOSE: 145 MG/DL (ref 74–99)
METER GLUCOSE: 201 MG/DL (ref 74–99)
MICROALBUMIN UR-MCNC: 205.4 MG/L
MICROALBUMIN/CREAT UR-RTO: 159.2 (ref 0–30)
MONOCYTES ABSOLUTE: 0.51 E9/L (ref 0.1–0.95)
MONOCYTES RELATIVE PERCENT: 5.1 % (ref 2–12)
NEUTROPHILS ABSOLUTE: 8.39 E9/L (ref 1.8–7.3)
NEUTROPHILS RELATIVE PERCENT: 84.5 % (ref 43–80)
OVALOCYTES: ABNORMAL
PDW BLD-RTO: 15.6 FL (ref 11.5–15)
PLATELET # BLD: 282 E9/L (ref 130–450)
PMV BLD AUTO: 11.4 FL (ref 7–12)
POIKILOCYTES: ABNORMAL
POLYCHROMASIA: ABNORMAL
POTASSIUM REFLEX MAGNESIUM: 4.4 MMOL/L (ref 3.5–5)
POTASSIUM, UR: 38.2 MMOL/L
PROTEIN PROTEIN: 48 MG/DL (ref 0–12)
PROTEIN/CREAT RATIO: 0.4
PROTEIN/CREAT RATIO: 0.4 (ref 0–0.2)
RBC # BLD: 3.11 E12/L (ref 3.5–5.5)
SODIUM BLD-SCNC: 140 MMOL/L (ref 132–146)
SODIUM URINE: 30 MMOL/L
WBC # BLD: 9.9 E9/L (ref 4.5–11.5)

## 2023-01-03 PROCEDURE — 2700000000 HC OXYGEN THERAPY PER DAY

## 2023-01-03 PROCEDURE — 99232 SBSQ HOSP IP/OBS MODERATE 35: CPT | Performed by: STUDENT IN AN ORGANIZED HEALTH CARE EDUCATION/TRAINING PROGRAM

## 2023-01-03 PROCEDURE — 82962 GLUCOSE BLOOD TEST: CPT

## 2023-01-03 PROCEDURE — 82570 ASSAY OF URINE CREATININE: CPT

## 2023-01-03 PROCEDURE — 84300 ASSAY OF URINE SODIUM: CPT

## 2023-01-03 PROCEDURE — 84133 ASSAY OF URINE POTASSIUM: CPT

## 2023-01-03 PROCEDURE — 36415 COLL VENOUS BLD VENIPUNCTURE: CPT

## 2023-01-03 PROCEDURE — 82436 ASSAY OF URINE CHLORIDE: CPT

## 2023-01-03 PROCEDURE — 1200000000 HC SEMI PRIVATE

## 2023-01-03 PROCEDURE — 85025 COMPLETE CBC W/AUTO DIFF WBC: CPT

## 2023-01-03 PROCEDURE — 6360000002 HC RX W HCPCS: Performed by: NURSE PRACTITIONER

## 2023-01-03 PROCEDURE — 83735 ASSAY OF MAGNESIUM: CPT

## 2023-01-03 PROCEDURE — 84156 ASSAY OF PROTEIN URINE: CPT

## 2023-01-03 PROCEDURE — 2580000003 HC RX 258: Performed by: NURSE PRACTITIONER

## 2023-01-03 PROCEDURE — 6370000000 HC RX 637 (ALT 250 FOR IP)

## 2023-01-03 PROCEDURE — 94640 AIRWAY INHALATION TREATMENT: CPT

## 2023-01-03 PROCEDURE — 80048 BASIC METABOLIC PNL TOTAL CA: CPT

## 2023-01-03 PROCEDURE — 82044 UR ALBUMIN SEMIQUANTITATIVE: CPT

## 2023-01-03 PROCEDURE — 2580000003 HC RX 258

## 2023-01-03 RX ORDER — DOXYCYCLINE HYCLATE 100 MG
100 TABLET ORAL 2 TIMES DAILY
Qty: 10 TABLET | Refills: 0 | Status: SHIPPED | OUTPATIENT
Start: 2023-01-03 | End: 2023-01-13

## 2023-01-03 RX ADMIN — ASPIRIN 81 MG: 81 TABLET, CHEWABLE ORAL at 08:37

## 2023-01-03 RX ADMIN — GABAPENTIN 300 MG: 300 CAPSULE ORAL at 14:16

## 2023-01-03 RX ADMIN — MICONAZOLE NITRATE: 2 POWDER TOPICAL at 08:38

## 2023-01-03 RX ADMIN — GABAPENTIN 300 MG: 300 CAPSULE ORAL at 21:30

## 2023-01-03 RX ADMIN — DIGOXIN 125 MCG: 0.12 TABLET ORAL at 08:37

## 2023-01-03 RX ADMIN — Medication 400 MG: at 08:37

## 2023-01-03 RX ADMIN — DOCUSATE SODIUM 100 MG: 100 CAPSULE, LIQUID FILLED ORAL at 08:37

## 2023-01-03 RX ADMIN — BUDESONIDE 500 MCG: 0.5 SUSPENSION RESPIRATORY (INHALATION) at 18:29

## 2023-01-03 RX ADMIN — DILTIAZEM HYDROCHLORIDE 120 MG: 120 CAPSULE, EXTENDED RELEASE ORAL at 22:02

## 2023-01-03 RX ADMIN — LEVOTHYROXINE SODIUM 200 MCG: 100 TABLET ORAL at 06:16

## 2023-01-03 RX ADMIN — INSULIN GLARGINE 15 UNITS: 100 INJECTION, SOLUTION SUBCUTANEOUS at 21:30

## 2023-01-03 RX ADMIN — PRAVASTATIN SODIUM 20 MG: 20 TABLET ORAL at 08:38

## 2023-01-03 RX ADMIN — MICONAZOLE NITRATE: 2 POWDER TOPICAL at 21:00

## 2023-01-03 RX ADMIN — WATER 1000 MG: 1 INJECTION INTRAMUSCULAR; INTRAVENOUS; SUBCUTANEOUS at 14:24

## 2023-01-03 RX ADMIN — APIXABAN 5 MG: 5 TABLET, FILM COATED ORAL at 08:37

## 2023-01-03 RX ADMIN — APIXABAN 5 MG: 5 TABLET, FILM COATED ORAL at 21:30

## 2023-01-03 RX ADMIN — GABAPENTIN 300 MG: 300 CAPSULE ORAL at 08:37

## 2023-01-03 RX ADMIN — SODIUM CHLORIDE: 9 INJECTION, SOLUTION INTRAVENOUS at 08:48

## 2023-01-03 RX ADMIN — DILTIAZEM HYDROCHLORIDE 120 MG: 120 CAPSULE, EXTENDED RELEASE ORAL at 08:38

## 2023-01-03 RX ADMIN — FLUOXETINE 20 MG: 20 CAPSULE ORAL at 21:39

## 2023-01-03 RX ADMIN — BUDESONIDE 500 MCG: 0.5 SUSPENSION RESPIRATORY (INHALATION) at 09:43

## 2023-01-03 RX ADMIN — FERROUS SULFATE TAB 325 MG (65 MG ELEMENTAL FE) 325 MG: 325 (65 FE) TAB at 08:37

## 2023-01-03 RX ADMIN — FLUOXETINE 20 MG: 20 CAPSULE ORAL at 08:37

## 2023-01-03 RX ADMIN — INSULIN GLARGINE 22 UNITS: 100 INJECTION, SOLUTION SUBCUTANEOUS at 08:39

## 2023-01-03 NOTE — CONSULTS
Department of Internal Medicine  Nephrology Nurse Practitioner Consult Note      Reason for Consult:  elevated creatinine  Requesting Physician:  KRISTYN De La Rosa CNP    CHIEF COMPLAINT:  increased confusion, blister on leg, toe injury    History Obtained From:  patient, electronic medical record    HISTORY OF PRESENT ILLNESS:  She was admitted on 1/1/22 after presenting to the ER with chief complaint of confusion for 2-3 days, an opened blister on her left leg and a great toe injury of her right foot.     Past Medical History:        Diagnosis Date    Acid reflux     Adult respiratory distress syndrome ([address]     after surgery was trached and was reversed has been resolved    Arthritis     osteo    Arthritis     Blood transfusion     with colon surgery and 1st foot surgery    Bronchitis     Chronic knee pain     Chronic sinusitis     Colitis     COPD (chronic obstructive pulmonary disease) ([facility])     stable no medications    Depression     Diabetes mellitus (HCC)     blood sugars run high per pt    Diverticulitis     Gall stones     H/O chest x-ray     Hemorrhoids     Hyperlipidemia     Hypertension     stable per pt    Hypothyroidism     MRSA (methicillin resistant Staphylococcus aureus) 2007    in left foot  resolved    Neuropathy     Numbness     Obesity     Overweight(278.02)     PICC (peripherally inserted central catheter) in place 09/2012    right upper arm for present antibiotic therapy    SOBOE (shortness of breath on exertion)     Thyroid disease     Urinary anomaly frequency,leakage,urgency, UTI    Varicose veins     Wheezing      Past Surgical History:        Procedure Laterality Date    ANKLE FUSION  2007    left    COLONOSCOPY      COLOSTOMY  2004    due to rupture diverticuli    FOOT DEBRIDEMENT      serveral  left    FOOT DEBRIDEMENT  08/29/2012    Left heel bone debridment removal of staple application of wound vac    FOOT DEBRIDEMENT Bilateral 1/2/2023    BILATERAL DEBRIDEMENT INCISION AND DRAINAGE OF FEET AND LEGS, RESECECTION OF ALL NONVIABLE TISSUE AND BONE performed by Haydee Luna MD at [address] [place] [address] TOE SURGERY      right    HARDWARE REMOVAL  2008    left foot    REVISION COLOSTOMY  2005 reversal    SKIN GRAFT      several left foot    TONSILLECTOMY      TRACHEOSTOMY  2004    after surgery developed peritonitis and was trached     Current Medications:    Current Facility-Administered Medications: cefTRIAXone (ROCEPHIN) 1,000 mg in sterile water 10 mL IV syringe, 1,000 mg, IntraVENous, Q24H  acetaminophen (TYLENOL) tablet 650 mg, 650 mg, Oral, Q4H PRN  traMADol (ULTRAM) tablet 50 mg, 50 mg, Oral, Q6H PRN  HYDROmorphone (DILAUDID) injection 1 mg, 1 mg, IntraVENous, Q4H PRN  apixaban (ELIQUIS) tablet 5 mg, 5 mg, Oral, BID  aspirin chewable tablet 81 mg, 81 mg, Oral, Daily  budesonide (PULMICORT) nebulizer suspension 500 mcg, 0.5 mg, Nebulization, BID  digoxin (LANOXIN) tablet 125 mcg, 125 mcg, Oral, Daily  dilTIAZem (CARDIZEM 12 HR) extended release capsule 120 mg, 120 mg, Oral, 2 times per day  docusate sodium (COLACE) capsule 100 mg, 100 mg, Oral, Daily  ferrous sulfate (IRON 325) tablet 325 mg, 325 mg, Oral, Daily with breakfast  FLUoxetine (PROZAC) capsule 20 mg, 20 mg, Oral, BID  insulin glargine (LANTUS) injection vial 22 Units, 22 Units, SubCUTAneous, QAM  insulin glargine (LANTUS) injection vial 15 Units, 15 Units, SubCUTAneous, Nightly  insulin lispro (HUMALOG) injection vial 0-4 Units, 0-4 Units, SubCUTAneous, TID WC  insulin lispro (HUMALOG) injection vial 0-4 Units, 0-4 Units, SubCUTAneous, Nightly  gabapentin (NEURONTIN) capsule 300 mg, 300 mg, Oral, TID  ipratropium-albuterol (DUONEB) nebulizer solution 3 mL, 1 vial, Nebulization, Q4H PRN  levothyroxine (SYNTHROID) tablet 200 mcg, 200 mcg, Oral, Daily  magnesium oxide (MAG-OX) tablet 400 mg, 400 mg, Oral, Daily  [Held by provider] metOLazone (ZAROXOLYN) tablet 2.5 mg, 2.5 mg, Oral, Daily  Pirfenidone TABS 801 mg (Patient Supplied), 801 mg, Oral, Daily  pravastatin (PRAVACHOL) tablet 20 mg, 20 mg, Oral, Daily  sodium chloride flush 0.9 % injection 5-40 mL, 5-40 mL, IntraVENous, 2 times per day  sodium chloride flush 0.9 % injection 5-40 mL, 5-40 mL, IntraVENous, PRN  0.9 % sodium chloride infusion, , IntraVENous, PRN  ondansetron (ZOFRAN-ODT) disintegrating tablet 4 mg, 4 mg, Oral, Q8H PRN **OR** ondansetron (ZOFRAN) injection 4 mg, 4 mg, IntraVENous, Q6H PRN  polyethylene glycol (GLYCOLAX) packet 17 g, 17 g, Oral, Daily PRN  0.9 % sodium chloride infusion, , IntraVENous, Continuous  glucose chewable tablet 16 g, 4 tablet, Oral, PRN  dextrose bolus 10% 125 mL, 125 mL, IntraVENous, PRN **OR** dextrose bolus 10% 250 mL, 250 mL, IntraVENous, PRN  glucagon (rDNA) injection 1 mg, 1 mg, SubCUTAneous, PRN  dextrose 10 % infusion, , IntraVENous, Continuous PRN  miconazole (MICOTIN) 2 % powder, , Topical, BID  white petrolatum ointment, , Topical, BID PRN  Allergies:  Piperacillin sod-tazobactam so, Sulfa antibiotics, and Vancomycin    Social History:    TOBACCO:   reports that she quit smoking about 18 years ago. Her smoking use included cigarettes. She started smoking about 52 years ago. She has a 60.00 pack-year smoking history. She has never used smokeless tobacco.  ETOH:   reports current alcohol use.     Family History:       Problem Relation Age of Onset    Prostate Cancer Father     Coronary Art Dis Mother     Diabetes Brother     Stroke Brother      REVIEW OF SYSTEMS:    CONSTITUTIONAL:  negative  EYES:  negative  HEENT:  negative  RESPIRATORY:  negative  CARDIOVASCULAR:  negative  GASTROINTESTINAL:  negative  GENITOURINARY:  negative  INTEGUMENT/BREAST:  negative  HEMATOLOGIC/LYMPHATIC:  negative  ALLERGIC/IMMUNOLOGIC:  negative  ENDOCRINE:  negative  MUSCULOSKELETAL:  negative  NEUROLOGICAL:  negative  BEHAVIOR/PSYCH:  negative  PHYSICAL EXAM:      Vitals:    VITALS:  BP (!) 142/64   Pulse 61   Temp 98.6 °F (37 °C) (Oral)   Resp 16   Ht 5' 8\" (1.727 m)   Wt 220 lb (99.8 kg)   LMP 2005   SpO2 98%   BMI 33.45 kg/m²   24HR BLOOD PRESSURE RANGE:  Systolic (82SBM), RAMÓN:907 , Min:126 , PB  ; Diastolic (54LNC), IYH:60, Min:60, Max:79   24HR INTAKE/OUTPUT:    Intake/Output Summary (Last 24 hours) at 1/3/2023 1421  Last data filed at 2023 1815  Gross per 24 hour   Intake 1713 ml   Output --   Net 1713 ml       Constitutional:  A&O, NAD  HEENT:  PERRLA  Respiratory:  CTA  Cardiovascular/Edema:  RRR, S1/S2  Gastrointestinal:  +BS  Neurologic:  Cranial nerves II-XII intact  Skin:  warm, dry  Other:  no edema    DATA:    CBC with Differential:    Lab Results   Component Value Date/Time    WBC 9.9 2023 11:04 AM    RBC 3.11 2023 11:04 AM    HGB 8.3 2023 11:04 AM    HCT 29.1 2023 11:04 AM     2023 11:04 AM    MCV 93.6 2023 11:04 AM    MCH 26.7 2023 11:04 AM    MCHC 28.5 2023 11:04 AM    RDW 15.6 2023 11:04 AM    SEGSPCT 87.2 2022 09:15 AM    LYMPHOPCT 7.0 2023 11:04 AM    MONOPCT 5.1 2023 11:04 AM    BASOPCT 0.4 2023 11:04 AM    MONOSABS 0.51 2023 11:04 AM    LYMPHSABS 0.70 2023 11:04 AM    EOSABS 0.22 2023 11:04 AM    BASOSABS 0.04 2023 11:04 AM     CMP:    Lab Results   Component Value Date/Time     2023 02:51 AM    K 4.0 2023 02:51 AM     2023 02:51 AM    CO2 31 2023 02:51 AM    BUN 49 2023 02:51 AM    CREATININE 2.6 2023 02:51 AM    AGRATIO 0.8 2022 12:41 PM    LABGLOM 19 2023 02:51 AM    GLUCOSE 160 2023 02:51 AM    GLUCOSE 335 2012 11:00 AM    PROT 6.8 2023 02:36 PM    LABALBU 3.1 2023 02:36 PM    LABALBU 3.7 2012 11:00 AM    CALCIUM 8.7 2023 02:51 AM    BILITOT 0.3 2023 02:36 PM    ALKPHOS 67 2023 02:36 PM    AST 11 2023 02:36 PM    ALT 8 2023 02:36 PM     Magnesium:    Lab Results   Component Value Date/Time    MG 2.3 01/03/2023 11:04 AM     Phosphorus:    Lab Results   Component Value Date/Time    PHOS 3.5 01/03/2022 09:15 AM     Radiology Review:  reviewed    IMPRESSION/RECOMMENDATIONS:      LAURIE stage 1 on CKD stage IV,likely prerenal intravascular volume depletion vs progression of CKD  CKD stage IV, baseline creatinine 1.2-1.8 mg/dL, last office visit was 2.2 mg/dL, (increased after starting Traci).  On furosemide, metolazone at home  AF, cardizem, digoxin  HTN, hydralazine  DM, SSI  UTI, on rocephin      Plan:  Continue IVF  Urine indices  Strict I&O  Hold diuretics

## 2023-01-03 NOTE — PROGRESS NOTES
Department of Podiatry   Progress Note      Subjective: Patient being seen for follow up s/p b/l foot and leg debridement with skin graft application(DOS:1/2/23). Resting comfortably in bed at time of encounter. Denies any acute pedal complaints.      Past Medical History:        Diagnosis Date    Acid reflux     Adult respiratory distress syndrome ([address]     after surgery was trached and was reversed has been resolved    Arthritis     osteo    Arthritis     Blood transfusion     with colon surgery and 1st foot surgery    Bronchitis     Chronic knee pain     Chronic sinusitis     Colitis     COPD (chronic obstructive pulmonary disease) ([facility])     stable no medications    Depression     Diabetes mellitus ([facility])     blood sugars run high per pt    Diverticulitis     Gall stones     H/O chest x-ray     Hemorrhoids     Hyperlipidemia     Hypertension     stable per pt    Hypothyroidism     MRSA (methicillin resistant Staphylococcus aureus) 2007    in left foot  resolved    Neuropathy     Numbness     Obesity     Overweight(278.02)     PICC (peripherally inserted central catheter) in place 09/2012    right upper arm for present antibiotic therapy    SOBOE (shortness of breath on exertion)     Thyroid disease     Urinary anomaly frequency,leakage,urgency, UTI    Varicose veins     Wheezing        Past Surgical History:        Procedure Laterality Date    ANKLE FUSION  2007    left    COLONOSCOPY      COLOSTOMY  2004    due to rupture diverticuli    FOOT DEBRIDEMENT      serveral  left    FOOT DEBRIDEMENT  08/29/2012    Left heel bone debridment removal of staple application of wound vac    HAMMER TOE SURGERY      right    HARDWARE REMOVAL  2008    left foot    REVISION COLOSTOMY  2005 reversal    SKIN GRAFT      several left foot    TONSILLECTOMY      TRACHEOSTOMY  2004    after surgery developed peritonitis and was trached       Medications Prior to Admission:    Medications Prior to Admission: insulin glargine (LANTUS) 100 UNIT/ML injection vial, Inject into the skin 2 times daily 45 in the morning and 35 at night  ipratropium-albuterol (DUONEB) 0.5-2.5 (3) MG/3ML SOLN nebulizer solution, Take 3 mLs by nebulization every 6 hours as needed for Shortness of Breath  budesonide (PULMICORT) 0.5 MG/2ML nebulizer suspension, Take 2 mLs by nebulization 2 times daily  MAGNESIUM-OXIDE 400 (241.3 Mg) MG TABS tablet, TAKE 2 TABLETS BY MOUTH EVERY DAY  loratadine (CLARITIN) 10 MG capsule,   digoxin (LANOXIN) 125 MCG tablet,   dilTIAZem (CARDIZEM) 120 MG tablet, Take 120 mg by mouth in the morning and 120 mg in the evening. docusate sodium (COLACE) 100 MG capsule, Twice a Day PRN  apixaban (ELIQUIS) 5 MG TABS tablet,   ferrous sulfate (IRON 325) 325 (65 Fe) MG tablet, Daily  furosemide (LASIX) 40 MG tablet,   metOLazone (ZAROXOLYN) 2.5 MG tablet,   OXYGEN, 4 L  pravastatin (PRAVACHOL) 20 MG tablet, Take 20 mg by mouth daily. Cholecalciferol (VITAMIN D3) 47334 UNITS CAPS, Take  by mouth every 7 days. acetaminophen (TYLENOL) 500 MG tablet, Take 500 mg by mouth every 6 hours as needed for Pain. insulin regular (HUMULIN R;NOVOLIN R) 100 UNIT/ML injection, Inject  into the skin See Admin Instructions. Sliding scale as needed 150-200 5 units  201-250 10 units  251-300 15 units 301-350 17 units 351-400 20 units  gabapentin (NEURONTIN) 100 MG capsule, Take 300 mg by mouth 3 times daily. levothyroxine (SYNTHROID) 200 MCG tablet, Take 200 mcg by mouth daily. Instructed to take with sip water am of surgery, 09/26/2012  FLUoxetine (PROZAC) 20 MG capsule, Take 20 mg by mouth 2 times daily. aspirin 81 MG chewable tablet, Take 81 mg by mouth daily. Instructed lpn to contact dr hurt re: preop instructions  insulin detemir (LEVEMIR) 100 UNIT/ML injection vial, Inject 60 Units into the skin every morning (before breakfast) 60 units at 0700; 55 units  At 4 p.m.   Pirfenidone (ESBRIET) 801 MG TABS, Take by mouth (Patient not taking: Reported on 1/1/2023)  canagliflozin (INVOKANA) 100 MG TABS tablet, Take 100 mg by mouth every morning (before breakfast). (Patient not taking: No sig reported)  POTASSIUM CITRATE PO, Take  by mouth daily. (Patient not taking: Reported on 1/1/2023)  Glucosamine-Chondroitin (GLUCOSAMINE CHONDR COMPLEX PO), Take  by mouth daily. (Patient not taking: No sig reported)  hydrALAZINE (APRESOLINE) 50 MG tablet, Take 50 mg by mouth 3 times daily. Instructed to take morning of surgery, 09/26/2012,with a sip of water (Patient not taking: No sig reported)  therapeutic multivitamin-minerals (THERAGRAN-M) tablet, Take 1 tablet by mouth daily. (Patient not taking: Reported on 5/24/2022)  FISH OIL, Take 1 capsule by mouth 2 times daily. Contact dr hurt re: preop instructions (Patient not taking: No sig reported)    Allergies:  Piperacillin sod-tazobactam so, Sulfa antibiotics, and Vancomycin    Social History:   TOBACCO:   reports that she quit smoking about 18 years ago. Her smoking use included cigarettes. She started smoking about 52 years ago. She has a 60.00 pack-year smoking history. She has never used smokeless tobacco.  ETOH:   reports current alcohol use. DRUGS:   Social History     Substance and Sexual Activity   Drug Use No       Family History:       Problem Relation Age of Onset    Prostate Cancer Father     Coronary Art Dis Mother     Diabetes Brother     Stroke Brother          REVIEW OF SYSTEMS:    All pertinent positives and negatives as noted in HPI       LOWER EXTREMITY EXAMINATION   B/L lower extremity dressings noted to be cdi with no excessive drainage. Patient able to actively range all digits without difficulty. No signs of venous congestion noted    PREVIOUS physical exam findings:   VASCULAR:  DP and PT pulses are palpable. CFT < 5 seconds B/L. Warm to warm from the tibial tuberosity to the distal aspect of the digits dorsally.     NEUROLOGIC:  Protective sensation is diminished     DERM:    - 1st and 2nd digit of the right foot wounds. Drainage noted to the wounds. Wound is scabbed over. No malodor noted. Edema and erythema noted to the digits. - Anterior left leg wound. Superficial in nature. Drainage noted to the wound. Wound measures 6cm x 3cm. Erythema and edema noted. Wound base is granular. - Left lateral wound noted. Wound measures 4 cm x 2 cm x 0.1 cm. Wound bed is fibrotic with a hyperkeratotic rim. No drainage, no malodor noted. Edema and erythema noted to the wound. MUSCULOSKELETAL: No deformities noted. 5/5 Gross Muscle strength in all 4 quadrants. CONSULTS:  IP CONSULT TO INFECTIOUS DISEASES  IP CONSULT TO PODIATRY  IP CONSULT TO ORTHOPEDIC SURGERY    MEDICATION:  Scheduled Meds:   apixaban  5 mg Oral BID    aspirin  81 mg Oral Daily    budesonide  0.5 mg Nebulization BID    digoxin  125 mcg Oral Daily    dilTIAZem  120 mg Oral 2 times per day    docusate sodium  100 mg Oral Daily    ferrous sulfate  325 mg Oral Daily with breakfast    FLUoxetine  20 mg Oral BID    insulin glargine  22 Units SubCUTAneous QAM    insulin glargine  15 Units SubCUTAneous Nightly    insulin lispro  0-4 Units SubCUTAneous TID WC    insulin lispro  0-4 Units SubCUTAneous Nightly    gabapentin  300 mg Oral TID    levothyroxine  200 mcg Oral Daily    magnesium oxide  400 mg Oral Daily    metOLazone  2.5 mg Oral Daily    Pirfenidone  801 mg Oral Daily    pravastatin  20 mg Oral Daily    sodium chloride flush  5-40 mL IntraVENous 2 times per day    miconazole   Topical BID     Continuous Infusions:   sodium chloride      sodium chloride 75 mL/hr at 01/03/23 0848    dextrose       PRN Meds:.acetaminophen, traMADol, HYDROmorphone, ipratropium-albuterol, sodium chloride flush, sodium chloride, ondansetron **OR** ondansetron, polyethylene glycol, glucose, dextrose bolus **OR** dextrose bolus, glucagon (rDNA), dextrose, white petrolatum    RADIOLOGY:  XR HAND RIGHT (MIN 3 VIEWS)   Final Result   1. Comminuted intra-articular fracture at the base of the 1st distal phalanx   with posterior displacement and rotation of a posterior fracture fragment. 2. Linear metallic more in body along the posterior aspect of the 1st distal   phalanx. 3. Osteoarthritis with severe degenerative changes of the 1st carpometacarpal   joint and milder degenerative changes throughout the interphalangeal joints. 4. Osteopenia. MRI FOOT LEFT WO CONTRAST   Preliminary Result   Acute osteomyelitis of the distal 5th metatarsal.      Chronic bony changes in the midfoot and hindfoot. XR FOOT LEFT (MIN 3 VIEWS)   Final Result   No fracture or dislocation. Significant osseous deformities again noted   involving the left foot, when compared to the previous study performed   07/13/2009 and without significant interval change. Patient is status post   interval amputation of the distal phalanx of the 2nd toe, along with a   portion of the middle phalanx. Interval osseous fusion of the bases of the   metatarsals, with the tarsal bones. Diffuse osteopenia again noted. Soft   tissue swelling diffusely about the foot and most prominently about the 2nd   toe. No evidence to suggest osteomyelitis. If osteomyelitis needs to be   excluded further, then MRI of the left foot with and without intravenous   gadolinium can be considered. XR TIBIA FIBULA LEFT (2 VIEWS)   Final Result   No acute osseous abnormality involving left tibia/fibula. XR FOOT RIGHT (MIN 3 VIEWS)   Final Result   1. Severe osteoarthritis involving interphalangeal joint of the 1st digit. 2. No obvious bony erosive changes to suggest osteomyelitis. No evidence of   subcutaneous gas.          VL LOWER EXTREMITY ARTERIAL SEGMENTAL PRESSURES W PPG BILATERAL    (Results Pending)   MRI FOOT RIGHT WO CONTRAST    (Results Pending)   US DUP LOWER EXTREMITIES BILATERAL VENOUS    (Results Pending)       Vitals:    /60   Pulse 54   Temp 97.4 °F (36.3 °C) (Oral)   Resp 16   Ht 5' 8\" (1.727 m)   Wt 220 lb (99.8 kg)   LMP 01/29/2005   SpO2 95%   BMI 33.45 kg/m²     LABS:   Recent Labs     01/01/23  1436 01/02/23  0251   WBC 11.1 11.7*   HGB 8.2* 8.2*   HCT 26.7* 28.1*    307     Recent Labs     01/02/23  0251      K 4.0      CO2 31*   BUN 49*   CREATININE 2.6*     Recent Labs     01/01/23  1436   PROT 6.8       ASSESSMENTS:   - s/p b/l foot and leg debridement with skin graft application(DOS:1/2/23)  -Right 1st and 2nd digit wounds, POA   - Left anterior leg wound, POA   - Left lateral foot wound, POA   - Type II diabetes   - Hyperlipidemia     PLAN:  - Examined and evaluated  - All labs, imaging, and charts reviewed  - Sugical cx: pending  - Abx per ID: No abx warranted at this time  - Venous US:pending   - Arterial studies: pending   - WBAT in surgical shoe to B/L lower extremity   - B/L lower extremity dressings to remain intact with podiatry managing  - No further surgical intervention indicated. Stable for d/c from Podiatry standpoint and once cleared by all other teams on board   - Will discharge with prescription for Doxycycline in soft chart. - Patient to follow up on 1/11 at [facility] at 1 pm for continued wound care. - Will continue to monitor while in-house   - Discussed with Dr. Tereso Severance      Thank you for involving podiatry in this patient's care.  Please do not hesitate to call with any questions, concerns, or new findings

## 2023-01-03 NOTE — PATIENT CARE CONFERENCE
P Quality Flow/Interdisciplinary Rounds Progress Note        Quality Flow Rounds held on January 3, 2023    Disciplines Attending:  Bedside Nurse, , , and Nursing Unit Leadership    Aaron Zuluaga was admitted on 1/1/2023  2:10 PM    Anticipated Discharge Date:       Disposition:    David Score:  David Scale Score: 12    Readmission Risk              Risk of Unplanned Readmission:  19           Discussed patient goal for the day, patient clinical progression, and barriers to discharge.   The following Goal(s) of the Day/Commitment(s) have been identified:  Diagnostics - Report Results      Michelle Chávez RN  January 3, 2023
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
## 2023-01-03 NOTE — PROGRESS NOTES
[facility] Progress Note    Admitting Date and Time: 1/1/2023  2:10 PM  Admit Dx: Open wound of left knee, leg, and ankle, initial encounter [S81.002A, S81.802A, S91.002A]  Open wound of right knee, leg, and ankle, initial encounter [S81.001A, S81.801A, S91.001A]  Urinary tract infection without hematuria, site unspecified [N39.0]  Altered mental status, unspecified altered mental status type [R41.82]  Altered mental status, unspecified [R41.82]  UTI (urinary tract infection) [N39.0]    Subjective:  Patient is being followed for Open wound of left knee, leg, and ankle, initial encounter [S81.002A, S81.802A, S91.002A]  Open wound of right knee, leg, and ankle, initial encounter [S81.001A, S81.801A, S91.001A]  Urinary tract infection without hematuria, site unspecified [N39.0]  Altered mental status, unspecified altered mental status type [R41.82]  Altered mental status, unspecified [R41.82]  UTI (urinary tract infection) [N39.0]       Patient seen laying in bed. She is alert and in no apparent distress. She has bilateral ace wraps to her LE. She is denying pain. Patient states she has noticed she is weaker than prior to admission. No acute issues  overnight. ROS: denies fever, chills, cp, sob, n/v, HA unless stated above.       apixaban  5 mg Oral BID    aspirin  81 mg Oral Daily    budesonide  0.5 mg Nebulization BID    digoxin  125 mcg Oral Daily    dilTIAZem  120 mg Oral 2 times per day    docusate sodium  100 mg Oral Daily    ferrous sulfate  325 mg Oral Daily with breakfast    FLUoxetine  20 mg Oral BID    insulin glargine  22 Units SubCUTAneous QAM    insulin glargine  15 Units SubCUTAneous Nightly    insulin lispro  0-4 Units SubCUTAneous TID WC    insulin lispro  0-4 Units SubCUTAneous Nightly    gabapentin  300 mg Oral TID    levothyroxine  200 mcg Oral Daily    magnesium oxide  400 mg Oral Daily    metOLazone  2.5 mg Oral Daily    Pirfenidone  801 mg Oral Daily    pravastatin  20 mg Oral Daily    sodium chloride flush  5-40 mL IntraVENous 2 times per day    miconazole   Topical BID     acetaminophen, 650 mg, Q4H PRN  traMADol, 50 mg, Q6H PRN  HYDROmorphone, 1 mg, Q4H PRN  ipratropium-albuterol, 1 vial, Q4H PRN  sodium chloride flush, 5-40 mL, PRN  sodium chloride, , PRN  ondansetron, 4 mg, Q8H PRN   Or  ondansetron, 4 mg, Q6H PRN  polyethylene glycol, 17 g, Daily PRN  glucose, 4 tablet, PRN  dextrose bolus, 125 mL, PRN   Or  dextrose bolus, 250 mL, PRN  glucagon (rDNA), 1 mg, PRN  dextrose, , Continuous PRN  white petrolatum, , BID PRN       Objective:    /60   Pulse 54   Temp 97.4 °F (36.3 °C) (Oral)   Resp 16   Ht 5' 8\" (1.727 m)   Wt 220 lb (99.8 kg)   LMP 01/29/2005   SpO2 95%   BMI 33.45 kg/m²     General Appearance: alert and oriented to person, place and time and in no acute distress  Skin: warm and dry  Head: normocephalic and atraumatic  Eyes: pupils equal, round, and reactive to light, extraocular eye movements intact, conjunctivae normal  Neck: neck supple and non tender without mass   Pulmonary/Chest: clear to auscultation bilaterally- no wheezes, rales or rhonchi, normal air movement, no respiratory distress  Cardiovascular: normal rate, normal S1 and S2 and no carotid bruits  Abdomen: soft, non-tender, non-distended, normal bowel sounds, no masses or organomegaly  Extremities: Bilateral lower extremity Ace wraps to feet.     Neurologic: no cranial nerve deficit and speech normal        Recent Labs     01/01/23  1436 01/02/23  0251    140   K 4.4 4.0   CL 99 101   CO2 29 31*   BUN 48* 49*   CREATININE 2.6* 2.6*   GLUCOSE 126* 160*   CALCIUM 8.7 8.7         Recent Labs     01/01/23  1436 01/02/23  0251   WBC 11.1 11.7*   RBC 2.99* 3.08*   HGB 8.2* 8.2*   HCT 26.7* 28.1*   MCV 89.3 91.2   MCH 27.4 26.6   MCHC 30.7* 29.2*   RDW 15.2* 15.5*    307   MPV 11.1 11.1             Assessment:    Principal Problem:    Altered mental status, unspecified  Active Problems:    UTI (urinary tract infection)    Encephalopathy    LAURIE (acute kidney injury) ([facility])  Resolved Problems:    * No resolved hospital problems. *      Plan:  Altered Mental Status likely 2/2 to UTI: UA positive nitrates, large leuks, greater than 20 WBCs, moderate bacteria. UC positive for e coli.  Monitor CBC and BMP daily.  Continue normal saline 75 cc/hr   E. coli UTI: Received Rocephin in the ED-was not continued inpatient.  Started on Rocephin 1 g for 5 days.  Await sensitivity. Transition to oral at discharge.  Leukocytosis: 2/2 UTI. WBC 11.7.  Afebrile.  Monitor CBC.  Continue antibiotics.  Right foot injury: Bilateral foot and leg debridement 1/2/23 with podiatry.  Wound care nurse consulted-podiatry input appreciated  COPD: On 4L oxygen at baseline. Continue Pulmicort BID   LAURIE on CKD: Baseline Cr 2.0, 2.6 today. NS increased to 100 cc/hr. hold Zaroxolyn. Continue to monitor BMP.  Consult Nephrology for recs-patient is known to Dr. Villalobos.  Type 2 Diabetes: At home is on lantus 45 in the morning and 30 at night as well as sliding scale. A1c 8/22 6.2 %.  Continue Lantus 22 in morning and 15 at night. Continue low-dose SSI. Carb controlled diet/Hypoglycemic protocol.  Atrial Fibrillation: Continue Digoxin 125 mcg daily, Cardizem 120 mg daily, and Eliquis 5mg BID    Diabetic Neuropathy: Continue Gabapentin 300 mg TID   Hypomagnesemia: Continue Magnesium oxide 400 mg daily     HLD: Continue Pravastatin 20 mg daily    CAD: Continue Aspirin 81 mg daily   Lymphedema: Continue metolazone 2.5 mg twice a week   Pulmonary Fibrosis: Continue Esbriet 267 TID    History of PE: Continue Eliquis 5mg BID    Depression: Continue Prozac 20 mg BID  Hypothyroidism: Continue Synthroid     In review of EMR, evaluation, management, and diagnosis. Care plan has been discussed with attending. Time spent 25 mins       NOTE: This report was transcribed using voice recognition software. Every effort was made to ensure accuracy;  however, inadvertent computerized transcription errors may be present.   Electronically signed by KRISTYN Izquierdo CNP on 1/3/2023 at 8:12 AM

## 2023-01-03 NOTE — ANESTHESIA POSTPROCEDURE EVALUATION
Department of Anesthesiology  Postprocedure Note    Patient: Jazmin Ndiaye  MRN: [id number]  YOB: 1955  Date of evaluation: 1/2/2023      Procedure Summary     Date: 01/02/23 Room / Location: [facility] 01 / 106 [facility]    Anesthesia Start: 9071 Anesthesia Stop: 1824    Procedure: BILATERAL DEBRIDEMENT INCISION AND DRAINAGE OF FEET AND LEGS, RESECECTION OF ALL NONVIABLE TISSUE AND BONE (Bilateral) Diagnosis:       Pain      (Pain [R52])    Surgeons: Audrey Lafleur MD Responsible Provider: Warner Flower MD    Anesthesia Type: MAC ASA Status: 4          Anesthesia Type: MAC    Sobeida Phase I:      Sobeida Phase II: Sobeida Score: 9      Anesthesia Post Evaluation    Patient location during evaluation: PACU  Patient participation: complete - patient participated  Level of consciousness: awake and alert  Pain score: 2  Airway patency: patent  Nausea & Vomiting: no nausea and no vomiting  Complications: no  Cardiovascular status: blood pressure returned to baseline  Respiratory status: acceptable  Hydration status: euvolemic

## 2023-01-04 ENCOUNTER — APPOINTMENT (OUTPATIENT)
Dept: ULTRASOUND IMAGING | Age: 68
DRG: 673 | End: 2023-01-04
Payer: MEDICARE

## 2023-01-04 LAB
AFB SMEAR: NORMAL
AFB SMEAR: NORMAL
ANION GAP SERPL CALCULATED.3IONS-SCNC: 9 MMOL/L (ref 7–16)
ANISOCYTOSIS: ABNORMAL
BASOPHILS ABSOLUTE: 0.06 E9/L (ref 0–0.2)
BASOPHILS RELATIVE PERCENT: 0.5 % (ref 0–2)
BUN BLDV-MCNC: 42 MG/DL (ref 6–23)
CALCIUM SERPL-MCNC: 8.2 MG/DL (ref 8.6–10.2)
CHLORIDE BLD-SCNC: 106 MMOL/L (ref 98–107)
CO2: 28 MMOL/L (ref 22–29)
CREAT SERPL-MCNC: 2.6 MG/DL (ref 0.5–1)
DIGOXIN LEVEL: 1.5 NG/ML (ref 0.8–2)
EOSINOPHILS ABSOLUTE: 0.29 E9/L (ref 0.05–0.5)
EOSINOPHILS RELATIVE PERCENT: 2.7 % (ref 0–6)
GFR SERPL CREATININE-BSD FRML MDRD: 19 ML/MIN/1.73
GLUCOSE BLD-MCNC: 106 MG/DL (ref 74–99)
HCT VFR BLD CALC: 29.9 % (ref 34–48)
HEMOGLOBIN: 8.5 G/DL (ref 11.5–15.5)
HYPOCHROMIA: ABNORMAL
IMMATURE GRANULOCYTES #: 0.1 E9/L
IMMATURE GRANULOCYTES %: 0.9 % (ref 0–5)
LYMPHOCYTES ABSOLUTE: 1.06 E9/L (ref 1.5–4)
LYMPHOCYTES RELATIVE PERCENT: 9.7 % (ref 20–42)
MCH RBC QN AUTO: 26.8 PG (ref 26–35)
MCHC RBC AUTO-ENTMCNC: 28.4 % (ref 32–34.5)
MCV RBC AUTO: 94.3 FL (ref 80–99.9)
METER GLUCOSE: 122 MG/DL (ref 74–99)
METER GLUCOSE: 144 MG/DL (ref 74–99)
METER GLUCOSE: 157 MG/DL (ref 74–99)
METER GLUCOSE: 186 MG/DL (ref 74–99)
MONOCYTES ABSOLUTE: 0.69 E9/L (ref 0.1–0.95)
MONOCYTES RELATIVE PERCENT: 6.3 % (ref 2–12)
NEUTROPHILS ABSOLUTE: 8.74 E9/L (ref 1.8–7.3)
NEUTROPHILS RELATIVE PERCENT: 79.9 % (ref 43–80)
ORGANISM: ABNORMAL
OVALOCYTES: ABNORMAL
PDW BLD-RTO: 15.6 FL (ref 11.5–15)
PLATELET # BLD: 293 E9/L (ref 130–450)
PMV BLD AUTO: 11.2 FL (ref 7–12)
POIKILOCYTES: ABNORMAL
POLYCHROMASIA: ABNORMAL
POTASSIUM REFLEX MAGNESIUM: 4.1 MMOL/L (ref 3.5–5)
RBC # BLD: 3.17 E12/L (ref 3.5–5.5)
SODIUM BLD-SCNC: 143 MMOL/L (ref 132–146)
URINE CULTURE, ROUTINE: ABNORMAL
WBC # BLD: 10.9 E9/L (ref 4.5–11.5)

## 2023-01-04 PROCEDURE — 2700000000 HC OXYGEN THERAPY PER DAY

## 2023-01-04 PROCEDURE — 97165 OT EVAL LOW COMPLEX 30 MIN: CPT

## 2023-01-04 PROCEDURE — 36415 COLL VENOUS BLD VENIPUNCTURE: CPT

## 2023-01-04 PROCEDURE — 97161 PT EVAL LOW COMPLEX 20 MIN: CPT

## 2023-01-04 PROCEDURE — 85025 COMPLETE CBC W/AUTO DIFF WBC: CPT

## 2023-01-04 PROCEDURE — 2580000003 HC RX 258

## 2023-01-04 PROCEDURE — 6360000002 HC RX W HCPCS: Performed by: NURSE PRACTITIONER

## 2023-01-04 PROCEDURE — 80048 BASIC METABOLIC PNL TOTAL CA: CPT

## 2023-01-04 PROCEDURE — 6370000000 HC RX 637 (ALT 250 FOR IP): Performed by: STUDENT IN AN ORGANIZED HEALTH CARE EDUCATION/TRAINING PROGRAM

## 2023-01-04 PROCEDURE — 2580000003 HC RX 258: Performed by: NURSE PRACTITIONER

## 2023-01-04 PROCEDURE — 93970 EXTREMITY STUDY: CPT

## 2023-01-04 PROCEDURE — 82962 GLUCOSE BLOOD TEST: CPT

## 2023-01-04 PROCEDURE — 80162 ASSAY OF DIGOXIN TOTAL: CPT

## 2023-01-04 PROCEDURE — 1200000000 HC SEMI PRIVATE

## 2023-01-04 PROCEDURE — 97535 SELF CARE MNGMENT TRAINING: CPT

## 2023-01-04 PROCEDURE — 6370000000 HC RX 637 (ALT 250 FOR IP)

## 2023-01-04 PROCEDURE — 97530 THERAPEUTIC ACTIVITIES: CPT

## 2023-01-04 PROCEDURE — 99232 SBSQ HOSP IP/OBS MODERATE 35: CPT | Performed by: STUDENT IN AN ORGANIZED HEALTH CARE EDUCATION/TRAINING PROGRAM

## 2023-01-04 PROCEDURE — 94640 AIRWAY INHALATION TREATMENT: CPT

## 2023-01-04 RX ORDER — GABAPENTIN 300 MG/1
300 CAPSULE ORAL 2 TIMES DAILY
Status: DISCONTINUED | OUTPATIENT
Start: 2023-01-04 | End: 2023-01-05 | Stop reason: HOSPADM

## 2023-01-04 RX ADMIN — DILTIAZEM HYDROCHLORIDE 120 MG: 120 CAPSULE, EXTENDED RELEASE ORAL at 10:02

## 2023-01-04 RX ADMIN — GABAPENTIN 300 MG: 300 CAPSULE ORAL at 10:02

## 2023-01-04 RX ADMIN — ASPIRIN 81 MG: 81 TABLET, CHEWABLE ORAL at 10:02

## 2023-01-04 RX ADMIN — PRAVASTATIN SODIUM 20 MG: 20 TABLET ORAL at 10:02

## 2023-01-04 RX ADMIN — DIGOXIN 125 MCG: 0.12 TABLET ORAL at 10:02

## 2023-01-04 RX ADMIN — MICONAZOLE NITRATE: 2 POWDER TOPICAL at 20:53

## 2023-01-04 RX ADMIN — Medication 400 MG: at 10:02

## 2023-01-04 RX ADMIN — APIXABAN 5 MG: 5 TABLET, FILM COATED ORAL at 10:02

## 2023-01-04 RX ADMIN — BUDESONIDE 500 MCG: 0.5 SUSPENSION RESPIRATORY (INHALATION) at 19:33

## 2023-01-04 RX ADMIN — DILTIAZEM HYDROCHLORIDE 120 MG: 120 CAPSULE, EXTENDED RELEASE ORAL at 20:51

## 2023-01-04 RX ADMIN — MICONAZOLE NITRATE: 2 POWDER TOPICAL at 10:05

## 2023-01-04 RX ADMIN — SODIUM CHLORIDE: 9 INJECTION, SOLUTION INTRAVENOUS at 13:38

## 2023-01-04 RX ADMIN — APIXABAN 5 MG: 5 TABLET, FILM COATED ORAL at 20:51

## 2023-01-04 RX ADMIN — FLUOXETINE 20 MG: 20 CAPSULE ORAL at 10:02

## 2023-01-04 RX ADMIN — GABAPENTIN 300 MG: 300 CAPSULE ORAL at 20:51

## 2023-01-04 RX ADMIN — WATER 1000 MG: 1 INJECTION INTRAMUSCULAR; INTRAVENOUS; SUBCUTANEOUS at 13:36

## 2023-01-04 RX ADMIN — BUDESONIDE 500 MCG: 0.5 SUSPENSION RESPIRATORY (INHALATION) at 08:05

## 2023-01-04 RX ADMIN — INSULIN GLARGINE 22 UNITS: 100 INJECTION, SOLUTION SUBCUTANEOUS at 10:03

## 2023-01-04 RX ADMIN — FERROUS SULFATE TAB 325 MG (65 MG ELEMENTAL FE) 325 MG: 325 (65 FE) TAB at 10:02

## 2023-01-04 RX ADMIN — LEVOTHYROXINE SODIUM 200 MCG: 100 TABLET ORAL at 06:41

## 2023-01-04 RX ADMIN — MUPIROCIN: 20 OINTMENT TOPICAL at 13:12

## 2023-01-04 RX ADMIN — DOCUSATE SODIUM 100 MG: 100 CAPSULE, LIQUID FILLED ORAL at 10:02

## 2023-01-04 RX ADMIN — SODIUM CHLORIDE, PRESERVATIVE FREE 10 ML: 5 INJECTION INTRAVENOUS at 18:02

## 2023-01-04 RX ADMIN — FLUOXETINE 20 MG: 20 CAPSULE ORAL at 20:51

## 2023-01-04 RX ADMIN — INSULIN GLARGINE 15 UNITS: 100 INJECTION, SOLUTION SUBCUTANEOUS at 20:51

## 2023-01-04 NOTE — PATIENT CARE CONFERENCE
P Quality Flow/Interdisciplinary Rounds Progress Note        Quality Flow Rounds held on January 4, 2023    Disciplines Attending:  Bedside Nurse, , , and Nursing Unit Leadership    Mile Aguilar was admitted on 1/1/2023  2:10 PM    Anticipated Discharge Date:       Disposition:    David Score:  David Scale Score: 19    Readmission Risk              Risk of Unplanned Readmission:  23           Discussed patient goal for the day, patient clinical progression, and barriers to discharge.   The following Goal(s) of the Day/Commitment(s) have been identified:  Labs - Report Results      Ricarda Ferreira RN  January 4, 2023

## 2023-01-04 NOTE — PROGRESS NOTES
Wound / ostomy dept consulted for lower extremity wounds. Podiatry is following. Will defer treatment to them.

## 2023-01-04 NOTE — CARE COORDINATION
Introduced my self and provided explanation of CM role to patient. Patient is awake, alert, and aware of current diagnosis and treatment plan including probable discharge to home. She voices she resides at home with alone her adl's with independence at power w/c level. She has shower bench, elevated toilet seat, and home oxygen from [facility]. She has daughter and son in law in community which assist her and will provide ride home. Patient is established with a pcp and denies any issue with retail pharmaceutical coverage. [facility] voices [facility], verified with Angela at agency. Resumption of care orders are placed along with wound care orders for home. Explained ELOS of <24 hours; patient voiced understanding and agreement. Will follow along with  and assist with discharge planning as necessary.

## 2023-01-04 NOTE — PROGRESS NOTES
Physical Therapy  Facility/Department: [facility]  Physical Therapy Initial Assessment    Name: Mile Aguilar  : 1955  MRN: [id number]  Date of Service: 2023    Attending Provider:  Karissa Carmona MD    Evaluating PT:  Jonathan Montana. Raven Wren P.T. Room #:  6498/4569-L  Diagnosis:  Open wound of left knee, leg, and ankle, initial encounter [S81.002A, S81.802A, S91.002A]  Open wound of right knee, leg, and ankle, initial encounter [S81.001A, S81.801A, S91.001A]  Urinary tract infection without hematuria, site unspecified [N39.0]  Altered mental status, unspecified altered mental status type [R41.82]  Altered mental status, unspecified [R41.82]  UTI (urinary tract infection) [N39.0], R distal phalanx of thumb fracture (non-operative management)  Pertinent PMHx/PSHx:  neuropathy BLE  Procedure/Surgery:  I and D B feet and legs with skin graft  Precautions:  WBAT BLE with surgical shoes in place, NWB R thumb, awaiting brace for R thumb. SUBJECTIVE:    Pt lives alone in a 1 floor apt with no stairs to enter. Pt ambulated with a few feet from recliner to hover round without AD PTA. She states she has a ww if needed. She uses her hover round for mobility. OBJECTIVE:   Initial Evaluation  Date: 23 Treatment Short Term/ Long Term   Goals   Was pt agreeable to Eval/treatment? yes     Does pt have pain? No c/o pain     Bed Mobility  Rolling: supervision  Supine to sit: supervision  Sit to supine: supervision  Scooting: supervision  Independent    Transfers Sit to stand: SBA  Stand to sit: SBA  Stand pivot: SBA with ww  Independent    Ambulation   3 feet with ww SBA  3 feet with ww Independent    Stair negotiation: ascended and descended NA  NA   AM-PAC 6 Clicks 94/96       BLE ROM is WFL, except B ankles NA due to ace wrapped. BLE strength is grossly 4/5. Sensation:  Pt c/o loss of sensation due to neuropathy BLE.   Balance: sitting is supervision and standing with ww is SBA  Endurance: fair    Patient education  Pt educated on NWB R thumb and WBAT BLE with B surgical shoes    Patient response to education:   Pt verbalized understanding Pt demonstrated skill Pt requires further education in this area   yes yes yes     ASSESSMENT:    Conditions Requiring Skilled Therapeutic Intervention:    [x]Decreased strength     []Decreased ROM  [x]Decreased functional mobility  [x]Decreased balance   [x]Decreased endurance   []Decreased posture  []Decreased sensation  []Decreased coordination   []Decreased vision  []Decreased safety awareness   []Increased pain       Comments:  Pt was found sitting up in recliner chair and was agreeable to PT. Pt does not typically walk as she uses a hover round for mobility. Her B surgical shoes were donned and then pt was able to stand up and transfer using ww and putting weight through R palm to avoid WB on thumb. Pt was able to get in/out of bed on her own and then stand up and walk 3 feet back to chair with ww. Pt is a risk for falls at this time. Treatment:  Patient practiced and was instructed in the following treatment:    Bed mobility, transfers, and gait with ww to improve functional strength and endurance. Pt was left sitting up in recliner chair on waffle cushion with call light left by patient. Chair/bed alarm: chair alarm was activated. Pt's/ family goals   1. To get better and go home. Patient and or family understand(s) diagnosis, prognosis, and plan of care.     PHYSICAL THERAPY PLAN OF CARE:    PT POC is established based on physician order and patient diagnosis     Referring provider/PT Order:  PT eval and treat  Diagnosis:  Open wound of left knee, leg, and ankle, initial encounter [S81.002A, S81.802A, S91.002A]  Open wound of right knee, leg, and ankle, initial encounter [S81.001A, S81.801A, S91.001A]  Urinary tract infection without hematuria, site unspecified [N39.0]  Altered mental status, unspecified altered mental status type [R41.82]  Altered mental status, unspecified [R41.82]  UTI (urinary tract infection) [N39.0]  Specific instructions for next treatment:  work toward [place] with bed mobility and transfers. Current Treatment Recommendations:     [x] Strengthening to improve independence with functional mobility   [] ROM to improve ROM and decrease spasm and pain which will help promote independence with functional mobility   [] Balance Training to improve static/dynamic balance and to reduce fall risk  [x] Endurance Training to improve activity tolerance during functional mobility   [x] Transfer Training to improve safety and independence with all functional transfers   [x] Gait Training to improve gait mechanics, endurance and assess need for appropriate assistive device  [] Stair Training in preparation for safe discharge home and/or into the community   [] Positioning to prevent skin breakdown and contractures  [] Safety and Education Training   [x] Patient/Caregiver Education   [] HEP  [] Other     PT long term treatment goals are located in above grid    Frequency of treatments: 2-5x/week x 1-2 weeks. Time in  12:20  Time out  12:45    Total Treatment Time  8 minutes     Evaluation Time includes thorough review of current medical information, gathering information on past medical history/social history and prior level of function, completion of standardized testing/informal observation of tasks, assessment of data and education on plan of care and goals. CPT codes:  [x] Low Complexity PT evaluation 74341  [] Moderate Complexity PT evaluation 23744  [] High Complexity PT evaluation 32209  [] PT Re-evaluation 09177  [] Gait training 07892 ** minutes  [] Manual therapy 09143 ** minutes  [x] Therapeutic activities 56070 8 minutes  [] Therapeutic exercises 46691 ** minutes  [] Neuromuscular reeducation 43861 ** minutes     Kong Pham., P.T.   License Number: [id number]

## 2023-01-04 NOTE — FLOWSHEET NOTE
Inpatient Wound Care    Admit Date: 1/1/2023  2:10 PM    Reason for consult:  Fingers    Significant history:  Admitted with AMS. Wound history:  POA    Findings:     01/04/23 1335   Wound 01/04/23 Finger (Comment which one) Anterior;Right   Date First Assessed/Time First Assessed: 01/04/23 1334   Location: (c) Finger (Comment which one)  Wound Location Orientation: Anterior;Right   Wound Image     Wound Etiology Burn   Wound Cleansed Cleansed with saline   Dressing/Treatment   (bactroban, gauze)   Wound Length (cm) 3 cm   Wound Width (cm) 2 cm   Wound Surface Area (cm^2) 6 cm^2   Wound Assessment Eschar dry   Drainage Amount None   Odor None   Roxanna-wound Assessment Dry/flaky       Impression:  Lopez on R 2nd and 3rd fingers    Interventions in place:  Pt states she burned her fingers cooking on Meghan day. Wounds cleansed with NSS  which helped remove a lot of the crusted material. Encouraged the Pt to wash her hands well. Recommend Bactroban topically then DSD. Plan:Daily dressing changes. **Informed Consent**    The patient has given verbal consent to have photos taken of Fingers R hand  and inserted into their chart as part of their permanent medical record for purposes of documentation, treatment management and/or medical review. All Images taken on 1/4/23 of patient name: Jazmin Ndiaye were transmitted and stored on [employer] located within [employer] by a registered [employer] Mobile Application Device.         Christina Munoz RN 1/4/2023 1:37 PM

## 2023-01-04 NOTE — PROGRESS NOTES
Department of Internal Medicine  Nephrology Progress Note      Events reviewed    SUBJECTIVE:  We are following for LAURIE on CKD. She has concerns about being able to care for herself and would like placement.      PHYSICAL EXAM:      Vitals:    VITALS:  BP (!) 150/66   Pulse 60   Temp 97.6 °F (36.4 °C) (Oral)   Resp 16   Ht 5' 8\" (1.727 m)   Wt 227 lb 3.2 oz (103.1 kg)   LMP 01/29/2005   SpO2 93%   BMI 34.55 kg/m²   24HR BLOOD PRESSURE RANGE:  Systolic (02JIC), HZQ:223 , Min:136 , DNB:051 ; Diastolic (57THP), ZJM:37, Min:63, Max:66  24HR INTAKE/OUTPUT:    Intake/Output Summary (Last 24 hours) at 1/4/2023 1158  Last data filed at 1/4/2023 0952  Gross per 24 hour   Intake 480 ml   Output 650 ml   Net -170 ml         Constitutional:  A&O, NAD  HEENT:  PERRLA  Respiratory:  CTA  Cardiovascular/Edema:  RRR, S1/S2  Gastrointestinal:  +BS  Neurologic:  Cranial nerves II-XII intact  Skin:  warm, dry  Other:  no edema    DATA:    CBC with Differential:    Lab Results   Component Value Date/Time    WBC 10.9 01/04/2023 02:48 AM    RBC 3.17 01/04/2023 02:48 AM    HGB 8.5 01/04/2023 02:48 AM    HCT 29.9 01/04/2023 02:48 AM     01/04/2023 02:48 AM    MCV 94.3 01/04/2023 02:48 AM    MCH 26.8 01/04/2023 02:48 AM    MCHC 28.4 01/04/2023 02:48 AM    RDW 15.6 01/04/2023 02:48 AM    SEGSPCT 87.2 01/03/2022 09:15 AM    LYMPHOPCT 9.7 01/04/2023 02:48 AM    MONOPCT 6.3 01/04/2023 02:48 AM    BASOPCT 0.5 01/04/2023 02:48 AM    MONOSABS 0.69 01/04/2023 02:48 AM    LYMPHSABS 1.06 01/04/2023 02:48 AM    EOSABS 0.29 01/04/2023 02:48 AM    BASOSABS 0.06 01/04/2023 02:48 AM     CMP:    Lab Results   Component Value Date/Time     01/04/2023 02:48 AM    K 4.1 01/04/2023 02:48 AM     01/04/2023 02:48 AM    CO2 28 01/04/2023 02:48 AM    BUN 42 01/04/2023 02:48 AM    CREATININE 2.6 01/04/2023 02:48 AM    AGRATIO 0.8 08/05/2022 12:41 PM    LABGLOM 19 01/04/2023 02:48 AM    GLUCOSE 106 01/04/2023 02:48 AM    GLUCOSE 335 06/07/2012 11:00 AM    PROT 6.8 01/01/2023 02:36 PM    LABALBU 3.1 01/01/2023 02:36 PM    LABALBU 3.7 06/07/2012 11:00 AM    CALCIUM 8.2 01/04/2023 02:48 AM    BILITOT 0.3 01/01/2023 02:36 PM    ALKPHOS 67 01/01/2023 02:36 PM    AST 11 01/01/2023 02:36 PM    ALT 8 01/01/2023 02:36 PM     Magnesium:    Lab Results   Component Value Date/Time    MG 2.3 01/03/2023 11:04 AM     Phosphorus:    Lab Results   Component Value Date/Time    PHOS 3.5 01/03/2022 09:15 AM     Radiology Review:  reviewed    IMPRESSION/RECOMMENDATIONS:      LAURIE stage 1 on CKD stage IV, likely prerenal intravascular volume depletion vs progression of CKD. Creatinine stable at 2.6 mg/dL, possible new baseline   CKD stage IV, baseline creatinine 1.2-1.8 mg/dL, last office visit was 2.2 mg/dL, (increased after starting Traci).  On furosemide, metolazone at home  AF, cardizem, digoxin  HTN, hydralazine  DM, SSI  UTI, on rocephin      Plan:  Continue IVF x 24 hours  BNP am  CXR am   Strict I&O  Hold diuretics

## 2023-01-04 NOTE — PROGRESS NOTES
[facility] Progress Note    Admitting Date and Time: 1/1/2023  2:10 PM  Admit Dx: Open wound of left knee, leg, and ankle, initial encounter [S81.002A, S81.802A, S91.002A]  Open wound of right knee, leg, and ankle, initial encounter [S81.001A, S81.801A, S91.001A]  Urinary tract infection without hematuria, site unspecified [N39.0]  Altered mental status, unspecified altered mental status type [R41.82]  Altered mental status, unspecified [R41.82]  UTI (urinary tract infection) [N39.0]    Subjective:  Patient is being followed for Open wound of left knee, leg, and ankle, initial encounter [S81.002A, S81.802A, S91.002A]  Open wound of right knee, leg, and ankle, initial encounter [S81.001A, S81.801A, S91.001A]  Urinary tract infection without hematuria, site unspecified [N39.0]  Altered mental status, unspecified altered mental status type [R41.82]  Altered mental status, unspecified [R41.82]  UTI (urinary tract infection) [N39.0]       Patient seen laying in bed. She is alert in no apparent distress. Her respirations are unlabored. She is on her baseline 4 L of oxygen. Patient has no complaints of pain at this time. No acute issues overnight.   ROS: denies fever, chills, cp, sob, n/v, HA unless stated above.      gabapentin  300 mg Oral BID    mupirocin   Topical Daily    cefTRIAXone (ROCEPHIN) IV  1,000 mg IntraVENous Q24H    apixaban  5 mg Oral BID    aspirin  81 mg Oral Daily    budesonide  0.5 mg Nebulization BID    digoxin  125 mcg Oral Daily    dilTIAZem  120 mg Oral 2 times per day    docusate sodium  100 mg Oral Daily    ferrous sulfate  325 mg Oral Daily with breakfast    FLUoxetine  20 mg Oral BID    insulin glargine  22 Units SubCUTAneous QAM    insulin glargine  15 Units SubCUTAneous Nightly    insulin lispro  0-4 Units SubCUTAneous TID WC    insulin lispro  0-4 Units SubCUTAneous Nightly    levothyroxine  200 mcg Oral Daily    magnesium oxide  400 mg Oral Daily    [Held by provider] metOLazone  2.5 mg Oral Daily    Pirfenidone  801 mg Oral Daily    pravastatin  20 mg Oral Daily    sodium chloride flush  5-40 mL IntraVENous 2 times per day    miconazole   Topical BID     acetaminophen, 650 mg, Q4H PRN  traMADol, 50 mg, Q6H PRN  HYDROmorphone, 1 mg, Q4H PRN  ipratropium-albuterol, 1 vial, Q4H PRN  sodium chloride flush, 5-40 mL, PRN  sodium chloride, , PRN  ondansetron, 4 mg, Q8H PRN   Or  ondansetron, 4 mg, Q6H PRN  polyethylene glycol, 17 g, Daily PRN  glucose, 4 tablet, PRN  dextrose bolus, 125 mL, PRN   Or  dextrose bolus, 250 mL, PRN  glucagon (rDNA), 1 mg, PRN  dextrose, , Continuous PRN  white petrolatum, , BID PRN       Objective:    BP (!) 150/66   Pulse 60   Temp 97.6 °F (36.4 °C) (Oral)   Resp 16   Ht 5' 8\" (1.727 m)   Wt 227 lb 3.2 oz (103.1 kg)   LMP 01/29/2005   SpO2 93%   BMI 34.55 kg/m²     General Appearance: alert and oriented to person, place and time and in no acute distress  Skin: warm and dry  Head: normocephalic and atraumatic  Eyes: pupils equal, round, and reactive to light, extraocular eye movements intact, conjunctivae normal  Neck: neck supple and non tender without mass   Pulmonary/Chest: clear to auscultation bilaterally- no wheezes, rales or rhonchi, normal air movement, no respiratory distress  Cardiovascular: normal rate, normal S1 and S2 and no carotid bruits  Abdomen: soft, non-tender, non-distended, normal bowel sounds, no masses or organomegaly  Extremities: Bilateral lower extremity Ace wraps to feet.     Neurologic: no cranial nerve deficit and speech normal        Recent Labs     01/02/23  0251 01/03/23  1104 01/04/23  0248    140 143   K 4.0 4.4 4.1    102 106   CO2 31* 27 28   BUN 49* 44* 42*   CREATININE 2.6* 2.5* 2.6*   GLUCOSE 160* 132* 106*   CALCIUM 8.7 8.4* 8.2*         Recent Labs     01/02/23  0251 01/03/23  1104 01/04/23  0248   WBC 11.7* 9.9 10.9   RBC 3.08* 3.11* 3.17*   HGB 8.2* 8.3* 8.5*   HCT 28.1* 29.1* 29.9* MCV 91.2 93.6 94.3   MCH 26.6 26.7 26.8   MCHC 29.2* 28.5* 28.4*   RDW 15.5* 15.6* 15.6*    282 293   MPV 11.1 11.4 11.2             Assessment:    Principal Problem:    Altered mental status, unspecified  Active Problems:    UTI (urinary tract infection)    Encephalopathy    LAURIE (acute kidney injury) ([facility] [place] [address]  Resolved Problems:    * No resolved hospital problems. *      Plan: Altered Mental Status likely 2/2 to UTI: UA positive nitrates, large leuks, greater than 20 WBCs, moderate bacteria. UC positive for e coli. Monitor CBC and BMP daily. Continue normal saline 75 cc/hr   E. coli UTI: Received Rocephin in the ED-was not continued inpatient. Started on Rocephin 1 g for 5 days-transition to oral cefdinir at discharge. Leukocytosis: 2/2 UTI. WBC 11.7. Afebrile. Monitor CBC. Continue antibiotics. Right foot injury: Bilateral foot and leg debridement 1/2/23 with podiatry. Wound care nurse consulted. Wound cultures positive for staph aureus-sensitivity to follow. COPD: On 4L oxygen at baseline. Continue Pulmicort BID   LAURIE on CKD: Baseline Cr 2.0, 2.6 today. Continue NS at 100 cc/hr. Continue to hold Zaroxolyn. Continue to monitor BMP. Type 2 Diabetes: At home is on lantus 45 in the morning and 30 at night as well as sliding scale. A1c 8/22 6.2 %. Continue Lantus 22 in morning and 15 at night. Continue low-dose SSI. Carb controlled diet/Hypoglycemic protocol.   Atrial Fibrillation: Continue Digoxin 125 mcg daily, Cardizem 120 mg daily, and Eliquis 5mg BID    Diabetic Neuropathy: Continue Gabapentin 300 mg TID   Hypomagnesemia: Continue Magnesium oxide 400 mg daily     HLD: Continue Pravastatin 20 mg daily    CAD: Continue Aspirin 81 mg daily   Lymphedema: Continue metolazone 2.5 mg twice a week   Pulmonary Fibrosis: Continue Esbriet 267 TID    History of PE: Continue Eliquis 5mg BID    Depression: Continue Prozac 20 mg BID  Hypothyroidism: Continue Synthroid     Disposition: Likely discharge home when stable. In review of EMR, evaluation, management, and diagnosis. Care plan has been discussed with attending. Time spent 25 mins       NOTE: This report was transcribed using voice recognition software. Every effort was made to ensure accuracy; however, inadvertent computerized transcription errors may be present.   Electronically signed by KRISTYN Sargent CNP on 1/4/2023 at 2:05 PM

## 2023-01-04 NOTE — PROGRESS NOTES
Department of Podiatry   Progress Note      Subjective: Patient being seen for follow up s/p b/l foot and leg debridement with skin graft application(DOS:1/2/23). Sitting up in chair, states that she prefers to go to a rehab facility as she doesn't feel that she can safely return home in her condition. No acute pedal complaints.      Past Medical History:        Diagnosis Date    Acid reflux     Adult respiratory distress syndrome ([address]     after surgery was trached and was reversed has been resolved    Arthritis     osteo    Arthritis     Blood transfusion     with colon surgery and 1st foot surgery    Bronchitis     Chronic knee pain     Chronic sinusitis     Colitis     COPD (chronic obstructive pulmonary disease) ([facility])     stable no medications    Depression     Diabetes mellitus ([facility])     blood sugars run high per pt    Diverticulitis     Gall stones     H/O chest x-ray     Hemorrhoids     Hyperlipidemia     Hypertension     stable per pt    Hypothyroidism     MRSA (methicillin resistant Staphylococcus aureus) 2007    in left foot  resolved    Neuropathy     Numbness     Obesity     Overweight(278.02)     PICC (peripherally inserted central catheter) in place 09/2012    right upper arm for present antibiotic therapy    SOBOE (shortness of breath on exertion)     Thyroid disease     Urinary anomaly frequency,leakage,urgency, UTI    Varicose veins     Wheezing        Past Surgical History:        Procedure Laterality Date    ANKLE FUSION  2007    left    COLONOSCOPY      COLOSTOMY  2004    due to rupture diverticuli    FOOT DEBRIDEMENT      serveral  left    FOOT DEBRIDEMENT  08/29/2012    Left heel bone debridment removal of staple application of wound vac    FOOT DEBRIDEMENT Bilateral 1/2/2023    BILATERAL FOOT AND LEG DEBRIDEMENT, RESECTION OF ALL NONVIABLE TISSUE AND BONE, GRAFT APPLICATION performed by [facility], MD at [address]      right    HARDWARE REMOVAL  2008    left foot REVISION COLOSTOMY  2005 reversal    SKIN GRAFT      several left foot    TONSILLECTOMY      TRACHEOSTOMY  2004    after surgery developed peritonitis and was trached       Medications Prior to Admission:    Medications Prior to Admission: insulin glargine (LANTUS) 100 UNIT/ML injection vial, Inject into the skin 2 times daily 45 in the morning and 35 at night  ipratropium-albuterol (DUONEB) 0.5-2.5 (3) MG/3ML SOLN nebulizer solution, Take 3 mLs by nebulization every 6 hours as needed for Shortness of Breath  budesonide (PULMICORT) 0.5 MG/2ML nebulizer suspension, Take 2 mLs by nebulization 2 times daily  MAGNESIUM-OXIDE 400 (241.3 Mg) MG TABS tablet, TAKE 2 TABLETS BY MOUTH EVERY DAY  loratadine (CLARITIN) 10 MG capsule,   digoxin (LANOXIN) 125 MCG tablet,   dilTIAZem (CARDIZEM) 120 MG tablet, Take 120 mg by mouth in the morning and 120 mg in the evening. docusate sodium (COLACE) 100 MG capsule, Twice a Day PRN  apixaban (ELIQUIS) 5 MG TABS tablet,   ferrous sulfate (IRON 325) 325 (65 Fe) MG tablet, Daily  furosemide (LASIX) 40 MG tablet,   metOLazone (ZAROXOLYN) 2.5 MG tablet,   OXYGEN, 4 L  pravastatin (PRAVACHOL) 20 MG tablet, Take 20 mg by mouth daily. Cholecalciferol (VITAMIN D3) 31767 UNITS CAPS, Take  by mouth every 7 days. acetaminophen (TYLENOL) 500 MG tablet, Take 500 mg by mouth every 6 hours as needed for Pain. insulin regular (HUMULIN R;NOVOLIN R) 100 UNIT/ML injection, Inject  into the skin See Admin Instructions. Sliding scale as needed 150-200 5 units  201-250 10 units  251-300 15 units 301-350 17 units 351-400 20 units  gabapentin (NEURONTIN) 100 MG capsule, Take 300 mg by mouth 3 times daily. levothyroxine (SYNTHROID) 200 MCG tablet, Take 200 mcg by mouth daily. Instructed to take with sip water am of surgery, 09/26/2012  FLUoxetine (PROZAC) 20 MG capsule, Take 20 mg by mouth 2 times daily. aspirin 81 MG chewable tablet, Take 81 mg by mouth daily.  Instructed lpn to contact dr Kelsey Candelaria re: preop instructions  insulin detemir (LEVEMIR) 100 UNIT/ML injection vial, Inject 60 Units into the skin every morning (before breakfast) 60 units at 0700; 55 units  At 4 p.m. Pirfenidone (ESBRIET) 801 MG TABS, Take by mouth (Patient not taking: Reported on 1/1/2023)  canagliflozin (INVOKANA) 100 MG TABS tablet, Take 100 mg by mouth every morning (before breakfast). (Patient not taking: No sig reported)  POTASSIUM CITRATE PO, Take  by mouth daily. (Patient not taking: Reported on 1/1/2023)  Glucosamine-Chondroitin (GLUCOSAMINE CHONDR COMPLEX PO), Take  by mouth daily. (Patient not taking: No sig reported)  hydrALAZINE (APRESOLINE) 50 MG tablet, Take 50 mg by mouth 3 times daily. Instructed to take morning of surgery, 09/26/2012,with a sip of water (Patient not taking: No sig reported)  therapeutic multivitamin-minerals (THERAGRAN-M) tablet, Take 1 tablet by mouth daily. (Patient not taking: Reported on 5/24/2022)  FISH OIL, Take 1 capsule by mouth 2 times daily. Contact dr hurt re: preop instructions (Patient not taking: No sig reported)    Allergies:  Piperacillin sod-tazobactam so, Sulfa antibiotics, and Vancomycin    Social History:   TOBACCO:   reports that she quit smoking about 18 years ago. Her smoking use included cigarettes. She started smoking about 52 years ago. She has a 60.00 pack-year smoking history. She has never used smokeless tobacco.  ETOH:   reports current alcohol use. DRUGS:   Social History     Substance and Sexual Activity   Drug Use No       Family History:       Problem Relation Age of Onset    Prostate Cancer Father     Coronary Art Dis Mother     Diabetes Brother     Stroke Brother          REVIEW OF SYSTEMS:    All pertinent positives and negatives as noted in HPI       LOWER EXTREMITY EXAMINATION   B/L lower extremity dressings noted to be cdi with no excessive drainage. Patient able to actively range all digits without difficulty.  No signs of venous congestion noted    PREVIOUS physical exam findings:   VASCULAR:  DP and PT pulses are palpable. CFT < 5 seconds B/L. Warm to warm from the tibial tuberosity to the distal aspect of the digits dorsally. NEUROLOGIC:  Protective sensation is diminished     DERM:    - 1st and 2nd digit of the right foot wounds. Drainage noted to the wounds. Wound is scabbed over. No malodor noted. Edema and erythema noted to the digits. - Anterior left leg wound. Superficial in nature. Drainage noted to the wound. Wound measures 6cm x 3cm. Erythema and edema noted. Wound base is granular. - Left lateral wound noted. Wound measures 4 cm x 2 cm x 0.1 cm. Wound bed is fibrotic with a hyperkeratotic rim. No drainage, no malodor noted. Edema and erythema noted to the wound. MUSCULOSKELETAL: No deformities noted. 5/5 Gross Muscle strength in all 4 quadrants.                         CONSULTS:  IP CONSULT TO INFECTIOUS DISEASES  IP CONSULT TO PODIATRY  IP CONSULT TO ORTHOPEDIC SURGERY  IP CONSULT TO NEPHROLOGY    MEDICATION:  Scheduled Meds:   gabapentin  300 mg Oral BID    mupirocin   Topical Daily    cefTRIAXone (ROCEPHIN) IV  1,000 mg IntraVENous Q24H    apixaban  5 mg Oral BID    aspirin  81 mg Oral Daily    budesonide  0.5 mg Nebulization BID    digoxin  125 mcg Oral Daily    dilTIAZem  120 mg Oral 2 times per day    docusate sodium  100 mg Oral Daily    ferrous sulfate  325 mg Oral Daily with breakfast    FLUoxetine  20 mg Oral BID    insulin glargine  22 Units SubCUTAneous QAM    insulin glargine  15 Units SubCUTAneous Nightly    insulin lispro  0-4 Units SubCUTAneous TID WC    insulin lispro  0-4 Units SubCUTAneous Nightly    levothyroxine  200 mcg Oral Daily    magnesium oxide  400 mg Oral Daily    [Held by provider] metOLazone  2.5 mg Oral Daily    Pirfenidone  801 mg Oral Daily    pravastatin  20 mg Oral Daily    sodium chloride flush  5-40 mL IntraVENous 2 times per day    miconazole   Topical BID     Continuous Infusions: sodium chloride      sodium chloride 100 mL/hr at 01/04/23 1338    dextrose       PRN Meds:.acetaminophen, traMADol, HYDROmorphone, ipratropium-albuterol, sodium chloride flush, sodium chloride, ondansetron **OR** ondansetron, polyethylene glycol, glucose, dextrose bolus **OR** dextrose bolus, glucagon (rDNA), dextrose, white petrolatum    RADIOLOGY:  US DUP LOWER EXTREMITIES BILATERAL VENOUS   Final Result   No evidence of DVT in either lower extremity. XR HAND RIGHT (MIN 3 VIEWS)   Final Result   1. Comminuted intra-articular fracture at the base of the 1st distal phalanx   with posterior displacement and rotation of a posterior fracture fragment. 2. Linear metallic more in body along the posterior aspect of the 1st distal   phalanx. 3. Osteoarthritis with severe degenerative changes of the 1st carpometacarpal   joint and milder degenerative changes throughout the interphalangeal joints. 4. Osteopenia. MRI FOOT LEFT WO CONTRAST   Final Result   Acute osteomyelitis of the distal 5th metatarsal.      Chronic bony changes in the midfoot and hindfoot. XR FOOT LEFT (MIN 3 VIEWS)   Final Result   No fracture or dislocation. Significant osseous deformities again noted   involving the left foot, when compared to the previous study performed   07/13/2009 and without significant interval change. Patient is status post   interval amputation of the distal phalanx of the 2nd toe, along with a   portion of the middle phalanx. Interval osseous fusion of the bases of the   metatarsals, with the tarsal bones. Diffuse osteopenia again noted. Soft   tissue swelling diffusely about the foot and most prominently about the 2nd   toe. No evidence to suggest osteomyelitis. If osteomyelitis needs to be   excluded further, then MRI of the left foot with and without intravenous   gadolinium can be considered.          XR TIBIA FIBULA LEFT (2 VIEWS)   Final Result   No acute osseous abnormality involving left tibia/fibula. XR FOOT RIGHT (MIN 3 VIEWS)   Final Result   1. Severe osteoarthritis involving interphalangeal joint of the 1st digit. 2. No obvious bony erosive changes to suggest osteomyelitis. No evidence of   subcutaneous gas. VL LOWER EXTREMITY ARTERIAL SEGMENTAL PRESSURES W PPG BILATERAL    (Results Pending)   MRI FOOT RIGHT WO CONTRAST    (Results Pending)   XR CHEST PORTABLE    (Results Pending)       Vitals:    BP (!) 150/66   Pulse 60   Temp 97.6 °F (36.4 °C) (Oral)   Resp 16   Ht 5' 8\" (1.727 m)   Wt 227 lb 3.2 oz (103.1 kg)   LMP 01/29/2005   SpO2 93%   BMI 34.55 kg/m²     LABS:   Recent Labs     01/03/23  1104 01/04/23  0248   WBC 9.9 10.9   HGB 8.3* 8.5*   HCT 29.1* 29.9*    293     Recent Labs     01/04/23  0248      K 4.1      CO2 28   BUN 42*   CREATININE 2.6*     Recent Labs     01/01/23  1436   PROT 6.8       ASSESSMENTS:   - s/p b/l foot and leg debridement with skin graft application(DOS:1/2/23)  -Right 1st and 2nd digit wounds, POA   - Left anterior leg wound, POA   - Left lateral foot wound, POA   - Type II diabetes   - Hyperlipidemia     PLAN:  - Examined and evaluated  - All labs, imaging, and charts reviewed  - Preliminary surgical cx: staph aureus, corynebacter   - Abx per ID: No abx warranted at this time  - Venous US: No DVT   - Arterial studies: pending   - WBAT in surgical shoe to B/L lower extremity   - B/L lower extremity dressings to remain intact with podiatry managing  - No further surgical intervention indicated. Stable for d/c from Podiatry standpoint and once cleared by all other teams on board   - Will discharge with prescription for Doxycycline in soft chart. - Patient to follow up on 1/[facility] at [facility] at 1 pm for continued wound care. - Will continue to monitor while in-house   - Discussed with Dr. Lawanda Loco      Thank you for involving podiatry in this patient's care.  Please do not hesitate to call with any questions, concerns, or new findings

## 2023-01-04 NOTE — PROGRESS NOTES
Your patient is on a medication that requires a renal and/or weight dose adjustment. Renal/Body Weight Function Assessment:    Date Body Weight IBW  Adjusted BW SCr  CrCl Dialysis status   1/4/2023 227 lb 3.2 oz (103.1 kg)  Ideal body weight: 63.9 kg (140 lb 14 oz)  Adjusted ideal body weight: 79.6 kg (175 lb 6.5 oz) Serum creatinine: 2.6 mg/dL (H) 01/04/23 0248  Estimated creatinine clearance: 26 mL/min (A) N/A       Pharmacy has dose-adjusted the following medication(s):    Date Previous Order Adjusted Order   1/4/2023 Gabapentin 300 mg TID Gabapentin 300 mg BID       These changes were made per protocol according to the [facility] Renal Dosing Policy/ [facility] Pharmacist Anticoagulant Review. *Please note this dose may need readjusted if your patient's condition changes. Please contact pharmacy with any questions regarding these changes.     ZENAIDA Baez [facility]  1/4/2023  10:42 AM

## 2023-01-04 NOTE — PROGRESS NOTES
Occupational Therapy  OCCUPATIONAL THERAPY INITIAL EVALUATION  [facility] [address]    Date: 2023     Patient Name: Katie Carrillo  MRN: [id number]  : 1955  Room: [address] [place], [place] [address]    Evaluating OT: Natalia Maker HOWARD Sanders, OTR/L - [id number]    Referring Provider: KRISTYN Cortes CNP  Specific Provider Orders/Date: \"OT eval and treat\" - 1/3/2023    Diagnosis: Open wound of left knee, leg, and ankle, initial encounter [S81.002A, S81.802A, S91.002A]  Open wound of right knee, leg, and ankle, initial encounter [S81.001A, S81.801A, S91.001A]  Urinary tract infection without hematuria, site unspecified [N39.0]  Altered mental status, unspecified altered mental status type [R41.82]  Altered mental status, unspecified [R41.82]  UTI (urinary tract infection) [N39.0]     Surgery: Patient underwent BILATERAL DEBRIDEMENT INCISION AND DRAINAGE OF FEET AND LEGS, RESECECTION OF ALL NONVIABLE TISSUE AND BONE WITH SKIN GRAFT SUBSTITUTE APPLICATION TO LEFT FOOT WOUND on 2023. Pertinent Medical History: OA, COPD, depression, DM, HTN, obesity, neuropathy, depression         Precautions: fall risk, WBAT B LEs with surgical shoes, NWB R thumb (per nursing, awaiting splint), O2 via nasal cannula, skin integrity    Assessment of Current Deficits:    [x] Functional mobility   [x]ADLs  [x] Strength               [x]Cognition   [x] Functional transfers   [x] IADLs         [x] Safety Awareness   [x]Endurance   [] Fine Coordination              [x] Balance      [] Vision/perception   [x]Sensation    []Gross Motor Coordination  [] ROM  [] Delirium                   [] Motor Control     OT PLAN OF CARE   OT POC is based on physician orders, patient diagnosis, and results of clinical assessment.   Frequency/Duration 2-5 days/week for 2 weeks PRN   Specific OT Treatment Interventions to Include:   * Instruction/training on adapted ADL techniques and AE recommendations to increase functional independence within precautions       * Training on energy conservation strategies, correct breathing pattern and techniques to improve independence/tolerance for self-care routine  * Functional transfer/mobility training/DME recommendations for increased independence, safety, and fall prevention  * Patient/Family education to increase follow through with safety techniques and functional independence  * Recommendation of environmental modifications for increased safety with functional transfers/mobility and ADLs  * Therapeutic exercise to improve motor endurance, ROM, and functional strength for ADLs/functional transfers  * Therapeutic activities to facilitate/challenge dynamic balance, stand tolerance for increased safety and independence with ADLs  * Therapeutic activities to facilitate gross/fine motor skills for increased independence with ADLs  * Neuro-muscular re-education: facilitation of righting/equilibrium reactions, midline orientation, scapular stability/mobility, normalization of muscle tone, and facilitation of volitional active controled movement  * Positioning to improve skin integrity, interaction with environment and functional independence    Recommended Adaptive Equipment: TBD     Home Living: Patient lives alone in a one-floor setup. Bathroom Setup: tub shower (with extended tub bench and grab bars)  Equipment Owned: [employer], lift chair    Prior Level of Function (PLOF): Patient noted that she is typically independent with ADLs and most IADLs; patient has assistance from an aide 4 hours per month. Patient was independent with functional transfers and only walked short distances at home prior to this hospitalization. Pain Level: No complaints of pain. Cognition: Patient alert and oriented x3. WFL command follow demonstrated. Patient pleasant, cooperative, and motivated to return to [facility] and home environment.   Memory: WFL  Sequencing: [facility]  Problem Solving: WFL  Judgement/Safety: WFL grossly    Functional Assessment:  AM-PAC Daily Activity Raw Score: 13/24   Initial Eval Status  Date: 1/4/2023 Treatment Status  Date:  Short Term Goals = Long Term Goals   Feeding SBA after Setup  Assistance needed with opening packages/containers of lunch tray at conclusion of this session secondary to NWB status of R thumb and burns on digits 3 + 4 of R hand. Independent / Mod I   Grooming Mod A  SBA (seated/standing at sink)   UB Dressing Mod A to do/don hospital gown. SBA   LB Dressing Max A to don/[facility] bilateral surgical shoes. Min A - with use of AE, as needed/appropriate   Bathing Max A  Min A - with use of AE/DME, as needed/appropriate   Toileting Max A needed for hygiene following use of BSC. SBA   Bed Mobility  Supine-to-Sit: Min A   Mod I / Independent in order to maximize patient's independence with ADLs, re-positioning, and other functional tasks. Functional Transfers Sit-to-Stand: CGA   from EOB and BSC. Stand-Pivot Transfer: Min A from EOB to [facility] (without device)  Independent   Functional Mobility CGA (with walker) for few steps from [facility] to bedside chair. Mod I / Independent with functional mobility (with device, as needed/appropriate) in order to maximize independence with ADLs/IADLs and other functional tasks. Balance Sitting: Good (at EOB)  Standing: Fair- (with walker)  Fair+ dynamic standing balance during completion of ADLs/IADLs and other functional tasks. Activity Tolerance Fair-  Patient will demonstrate Good understanding and consistent implementation of energy conservation techniques and work simplification techniques into ADL/IADL routines. Visual/  Perceptual WFL     N/A     Additional Long-Term Goal: Patient will increase functional independence to PLOF in order to allow patient to live in least restrictive environment. Strength: ROM: Additional Information:    R UE  3+/5 grossly  NWB R thumb.  WFL grossly NWB R thumb, which limits patient's independence with ADLs. L UE 3+/5  WFL grossly Limited L hand strength, per patient report; patient noted that she frequently drops items with her L hand. Hand Dominance: Left, but patient had been relying upon her R hand with most tasks recently. [facility] grossly  Sensation: Patient reported experiencing numbness/tingling in B hands (L hand more than the R hand). Tone: WFL  Edema: No    Comments: RN approved patient's participation in [address] [place] [address] activities. Upon arrival, patient supine in bed. At end of session, patient seated in bedside chair with call light and phone within reach, waffle cushion in place, and all lines and tubes intact. Patient would benefit from continued skilled OT to increase safety and independence with completion of ADL/IADL tasks for functional independence and quality of life. Treatment: OT treatment provided this date included:   Instruction/training on safety and adapted techniques for completion of ADLs. Instruction/training on safe functional mobility/transfer techniques. Instruction/training on implications of NWB status of R thumb on ADLs/IADLs and functional transfers/mobility. Patient education provided regardin) importance of having staff assistance with ADLs and other OOB activities to prevent falls/injury during hospitalization, 2) potential benefits of continued therapy upon discharge. Patient indicated understanding. Further skilled OT treatment indicated to increase patient's safety and independence with completion of ADL/IADL tasks in order to maximize patient's functional independence and quality of life. Rehab Potential: Good for established goals. Patient / Family Goal: Patient wants to be able to return to her PLOF. Patient and/or family were instructed on functional diagnosis, prognosis/goals, and OT plan of care. Demonstrated Good understanding.     Eval Complexity: Low    Time In: 1100  Time Out: 1145  Total Treatment Time: 25 minutes      Minutes Units   OT Eval Low 09185 20 1   OT Eval Medium 54251     OT Eval High 45794     OT Re-Eval [id number]     Therapeutic Ex 72795     Therapeutic Activities 33468     ADL/Self Care 13332 25 2   Orthotic Management 06012     Neuro Re-Ed 47525     Non-Billable Time N/A ---     Evaluation time includes thorough review of current medical information, gathering information on past medical history/social history and prior level of function, completion of standardized testing/informal observation of tasks, assessment of data, and education on plan of care and goals. Meli George OTR/L  License Number: [id number]

## 2023-01-05 ENCOUNTER — APPOINTMENT (OUTPATIENT)
Dept: GENERAL RADIOLOGY | Age: 68
DRG: 673 | End: 2023-01-05
Payer: MEDICARE

## 2023-01-05 VITALS
OXYGEN SATURATION: 97 % | HEIGHT: 68 IN | HEART RATE: 50 BPM | TEMPERATURE: 97.8 F | SYSTOLIC BLOOD PRESSURE: 163 MMHG | RESPIRATION RATE: 18 BRPM | DIASTOLIC BLOOD PRESSURE: 71 MMHG | WEIGHT: 227 LBS | BODY MASS INDEX: 34.4 KG/M2

## 2023-01-05 LAB
ANAEROBIC CULTURE: NORMAL
ANAEROBIC CULTURE: NORMAL
ANION GAP SERPL CALCULATED.3IONS-SCNC: 11 MMOL/L (ref 7–16)
BASOPHILIC STIPPLING: ABNORMAL
BASOPHILS ABSOLUTE: 0.06 E9/L (ref 0–0.2)
BASOPHILS RELATIVE PERCENT: 0.5 % (ref 0–2)
BUN BLDV-MCNC: 37 MG/DL (ref 6–23)
CALCIUM SERPL-MCNC: 8.3 MG/DL (ref 8.6–10.2)
CHLORIDE BLD-SCNC: 103 MMOL/L (ref 98–107)
CO2: 27 MMOL/L (ref 22–29)
CREAT SERPL-MCNC: 2.5 MG/DL (ref 0.5–1)
EOSINOPHILS ABSOLUTE: 0.32 E9/L (ref 0.05–0.5)
EOSINOPHILS RELATIVE PERCENT: 2.8 % (ref 0–6)
FUNGUS STAIN: NORMAL
FUNGUS STAIN: NORMAL
GFR SERPL CREATININE-BSD FRML MDRD: 20 ML/MIN/1.73
GLUCOSE BLD-MCNC: 90 MG/DL (ref 74–99)
HCT VFR BLD CALC: 29.3 % (ref 34–48)
HEMOGLOBIN: 8.3 G/DL (ref 11.5–15.5)
HYPOCHROMIA: ABNORMAL
IMMATURE GRANULOCYTES #: 0.07 E9/L
IMMATURE GRANULOCYTES %: 0.6 % (ref 0–5)
LYMPHOCYTES ABSOLUTE: 1.01 E9/L (ref 1.5–4)
LYMPHOCYTES RELATIVE PERCENT: 9 % (ref 20–42)
MCH RBC QN AUTO: 26.6 PG (ref 26–35)
MCHC RBC AUTO-ENTMCNC: 28.3 % (ref 32–34.5)
MCV RBC AUTO: 93.9 FL (ref 80–99.9)
METER GLUCOSE: 149 MG/DL (ref 74–99)
MONOCYTES ABSOLUTE: 0.59 E9/L (ref 0.1–0.95)
MONOCYTES RELATIVE PERCENT: 5.2 % (ref 2–12)
NEUTROPHILS ABSOLUTE: 9.21 E9/L (ref 1.8–7.3)
NEUTROPHILS RELATIVE PERCENT: 81.9 % (ref 43–80)
OVALOCYTES: ABNORMAL
PDW BLD-RTO: 15.5 FL (ref 11.5–15)
PLATELET # BLD: 326 E9/L (ref 130–450)
PMV BLD AUTO: 11.1 FL (ref 7–12)
POIKILOCYTES: ABNORMAL
POLYCHROMASIA: ABNORMAL
POTASSIUM REFLEX MAGNESIUM: 3.7 MMOL/L (ref 3.5–5)
PRO-BNP: ABNORMAL PG/ML (ref 0–125)
RBC # BLD: 3.12 E12/L (ref 3.5–5.5)
SODIUM BLD-SCNC: 141 MMOL/L (ref 132–146)
WBC # BLD: 11.3 E9/L (ref 4.5–11.5)

## 2023-01-05 PROCEDURE — 6360000002 HC RX W HCPCS: Performed by: NURSE PRACTITIONER

## 2023-01-05 PROCEDURE — 85025 COMPLETE CBC W/AUTO DIFF WBC: CPT

## 2023-01-05 PROCEDURE — 6370000000 HC RX 637 (ALT 250 FOR IP)

## 2023-01-05 PROCEDURE — 71045 X-RAY EXAM CHEST 1 VIEW: CPT

## 2023-01-05 PROCEDURE — 6370000000 HC RX 637 (ALT 250 FOR IP): Performed by: STUDENT IN AN ORGANIZED HEALTH CARE EDUCATION/TRAINING PROGRAM

## 2023-01-05 PROCEDURE — 83880 ASSAY OF NATRIURETIC PEPTIDE: CPT

## 2023-01-05 PROCEDURE — 82962 GLUCOSE BLOOD TEST: CPT

## 2023-01-05 PROCEDURE — 2700000000 HC OXYGEN THERAPY PER DAY

## 2023-01-05 PROCEDURE — 2580000003 HC RX 258: Performed by: NURSE PRACTITIONER

## 2023-01-05 PROCEDURE — 36415 COLL VENOUS BLD VENIPUNCTURE: CPT

## 2023-01-05 PROCEDURE — 94640 AIRWAY INHALATION TREATMENT: CPT

## 2023-01-05 PROCEDURE — 99239 HOSP IP/OBS DSCHRG MGMT >30: CPT | Performed by: STUDENT IN AN ORGANIZED HEALTH CARE EDUCATION/TRAINING PROGRAM

## 2023-01-05 PROCEDURE — 6370000000 HC RX 637 (ALT 250 FOR IP): Performed by: INTERNAL MEDICINE

## 2023-01-05 PROCEDURE — 80048 BASIC METABOLIC PNL TOTAL CA: CPT

## 2023-01-05 PROCEDURE — 6370000000 HC RX 637 (ALT 250 FOR IP): Performed by: NURSE PRACTITIONER

## 2023-01-05 RX ORDER — FLUOXETINE HYDROCHLORIDE 20 MG/1
20 CAPSULE ORAL 2 TIMES DAILY
Qty: 30 CAPSULE | Refills: 3 | DISCHARGE
Start: 2023-01-05

## 2023-01-05 RX ORDER — INSULIN GLARGINE 100 [IU]/ML
22 INJECTION, SOLUTION SUBCUTANEOUS EVERY MORNING
Qty: 10 ML | Refills: 0 | DISCHARGE
Start: 2023-01-06

## 2023-01-05 RX ORDER — INSULIN GLARGINE 100 [IU]/ML
15 INJECTION, SOLUTION SUBCUTANEOUS NIGHTLY
Qty: 10 ML | Refills: 0 | DISCHARGE
Start: 2023-01-05

## 2023-01-05 RX ORDER — CEFDINIR 300 MG/1
300 CAPSULE ORAL 2 TIMES DAILY
Qty: 6 CAPSULE | Refills: 0 | DISCHARGE
Start: 2023-01-05 | End: 2023-01-08

## 2023-01-05 RX ORDER — DILTIAZEM HYDROCHLORIDE 120 MG/1
120 CAPSULE, EXTENDED RELEASE ORAL EVERY 12 HOURS SCHEDULED
Qty: 60 CAPSULE | Refills: 0 | DISCHARGE
Start: 2023-01-05

## 2023-01-05 RX ORDER — TORSEMIDE 20 MG/1
20 TABLET ORAL DAILY
Status: DISCONTINUED | OUTPATIENT
Start: 2023-01-05 | End: 2023-01-05 | Stop reason: HOSPADM

## 2023-01-05 RX ORDER — LANOLIN ALCOHOL/MO/W.PET/CERES
400 CREAM (GRAM) TOPICAL DAILY
Qty: 30 TABLET | DISCHARGE
Start: 2023-01-06

## 2023-01-05 RX ORDER — TORSEMIDE 20 MG/1
20 TABLET ORAL DAILY
Qty: 30 TABLET | Refills: 0 | DISCHARGE
Start: 2023-01-05

## 2023-01-05 RX ORDER — IPRATROPIUM BROMIDE AND ALBUTEROL SULFATE 2.5; .5 MG/3ML; MG/3ML
1 SOLUTION RESPIRATORY (INHALATION) EVERY 4 HOURS PRN
Qty: 360 ML | DISCHARGE
Start: 2023-01-05

## 2023-01-05 RX ORDER — DIGOXIN 125 MCG
125 TABLET ORAL DAILY
Qty: 30 TABLET | Refills: 0 | DISCHARGE
Start: 2023-01-06

## 2023-01-05 RX ORDER — FERROUS SULFATE 325(65) MG
325 TABLET ORAL
Qty: 30 TABLET | Refills: 0 | DISCHARGE
Start: 2023-01-06

## 2023-01-05 RX ORDER — SIMETHICONE 80 MG
80 TABLET,CHEWABLE ORAL EVERY 6 HOURS PRN
Qty: 180 TABLET | Refills: 3 | DISCHARGE
Start: 2023-01-05

## 2023-01-05 RX ORDER — PSEUDOEPHEDRINE HCL 30 MG
100 TABLET ORAL DAILY
DISCHARGE
Start: 2023-01-06

## 2023-01-05 RX ORDER — SIMETHICONE 80 MG
80 TABLET,CHEWABLE ORAL EVERY 6 HOURS PRN
Status: DISCONTINUED | OUTPATIENT
Start: 2023-01-05 | End: 2023-01-05 | Stop reason: HOSPADM

## 2023-01-05 RX ORDER — GABAPENTIN 300 MG/1
300 CAPSULE ORAL 2 TIMES DAILY
Qty: 90 CAPSULE | Refills: 0 | DISCHARGE
Start: 2023-01-05 | End: 2023-02-04

## 2023-01-05 RX ADMIN — GABAPENTIN 300 MG: 300 CAPSULE ORAL at 09:21

## 2023-01-05 RX ADMIN — WATER 1000 MG: 1 INJECTION INTRAMUSCULAR; INTRAVENOUS; SUBCUTANEOUS at 13:52

## 2023-01-05 RX ADMIN — BUDESONIDE 500 MCG: 0.5 SUSPENSION RESPIRATORY (INHALATION) at 10:00

## 2023-01-05 RX ADMIN — INSULIN GLARGINE 22 UNITS: 100 INJECTION, SOLUTION SUBCUTANEOUS at 09:26

## 2023-01-05 RX ADMIN — MUPIROCIN: 20 OINTMENT TOPICAL at 09:22

## 2023-01-05 RX ADMIN — LEVOTHYROXINE SODIUM 200 MCG: 100 TABLET ORAL at 09:26

## 2023-01-05 RX ADMIN — FERROUS SULFATE TAB 325 MG (65 MG ELEMENTAL FE) 325 MG: 325 (65 FE) TAB at 09:21

## 2023-01-05 RX ADMIN — DOCUSATE SODIUM 100 MG: 100 CAPSULE, LIQUID FILLED ORAL at 09:21

## 2023-01-05 RX ADMIN — DIGOXIN 125 MCG: 0.12 TABLET ORAL at 09:21

## 2023-01-05 RX ADMIN — TORSEMIDE 20 MG: 20 TABLET ORAL at 15:36

## 2023-01-05 RX ADMIN — APIXABAN 5 MG: 5 TABLET, FILM COATED ORAL at 09:21

## 2023-01-05 RX ADMIN — PRAVASTATIN SODIUM 20 MG: 20 TABLET ORAL at 09:21

## 2023-01-05 RX ADMIN — Medication 400 MG: at 09:21

## 2023-01-05 RX ADMIN — ASPIRIN 81 MG: 81 TABLET, CHEWABLE ORAL at 09:21

## 2023-01-05 RX ADMIN — MICONAZOLE NITRATE: 2 POWDER TOPICAL at 09:21

## 2023-01-05 RX ADMIN — FLUOXETINE 20 MG: 20 CAPSULE ORAL at 09:21

## 2023-01-05 RX ADMIN — SIMETHICONE 80 MG: 80 TABLET, CHEWABLE ORAL at 11:06

## 2023-01-05 RX ADMIN — DILTIAZEM HYDROCHLORIDE 120 MG: 120 CAPSULE, EXTENDED RELEASE ORAL at 09:21

## 2023-01-05 NOTE — DISCHARGE SUMMARY
[facility] Physician Discharge Summary       KRISTYN Santillan NP  [address]  [phone]    Follow up in 2 week(s)      [place] [address]  [phone]          Activity level: As tolerated     Dispo:SNF      Condition on discharge: Stable     Patient ID:  Felice Found  [id number]  45 y.o.  1955    Admit date: 1/1/2023    Discharge date and time:  1/5/2023  2:31 PM    Admission Diagnoses: Principal Problem:    Altered mental status, unspecified  Active Problems:    UTI (urinary tract infection)    Encephalopathy    LAURIE (acute kidney injury) ([facility] [address]  Resolved Problems:    * No resolved hospital problems. *      Discharge Diagnoses: Principal Problem:    Altered mental status, unspecified  Active Problems:    UTI (urinary tract infection)    Encephalopathy    LAURIE (acute kidney injury) ([facility] [address]  Resolved Problems:    * No resolved hospital problems. *      Consults:  IP CONSULT TO INFECTIOUS DISEASES  IP CONSULT TO PODIATRY  IP CONSULT TO ORTHOPEDIC SURGERY  IP CONSULT TO NEPHROLOGY  IP CONSULT TO IV TEAM    Procedures: Bilateral foot and leg debridement 1/2/2023    Hospital Course:   Patient presented to the ED with complaints ofconfusion, right toe and left shin injury. Patient states that over the past 2-3 days she has had increased confusion. She this morning she woke up with an opened blister on her left leg as well as a great toe injury of her right foot. She is uncertain how she acquired those injuries. She denies any increased urinary frequency, urinary burning, fevers, or chills. In the ED vitals were notable for blood pressure 138/54. Labs were notable for creatinine 2.6 with baseline 2.0, troponin 97 and 97, WBC 11.1, Hgb 8.2 baseline 11. Urinalysis shows large blood, nitrite positive, large leukocyte esterase, and moderate bacteria. Blood and urine cultures were drawn.  Xrays of the feet, and left tibia/fibula did not show any acute process. She received 1,000 mg Rocephin. Patient was admitted for further management. Altered Mental Status likely 2/2 to UTI: UA positive nitrates, large leuks, greater than 20 WBCs, moderate bacteria. UC positive for e coli. Monitor CBC and BMP daily. Received normal saline 75 cc/hr   E. coli UTI: Received Rocephin in the ED-was not continued inpatient. Started on Rocephin 1 g for 5 days-transitioned to oral cefdinir at discharge. Leukocytosis: Resolved. 2/2 UTI. WBC 11.7. Afebrile. Monitor CBC. Continue antibiotics. Right foot injury: Bilateral foot and leg debridement 1/2/23 with podiatry. Wound care nurse consulted. Wound cultures positive for staph aureus-patient placed on course of doxycycline at discharge podiatry  COPD: On 4L oxygen at baseline. Continue Pulmicort BID   LAURIE on CKD: Continued IV fluids transitioned to Demadex creatinine at baseline  Type 2 Diabetes:  A1c 8/22 6.2 %. Continued Lantus 22 in morning and 15 at night. Continued low-dose SSI. Carb controlled diet/Hypoglycemic protocol. Atrial Fibrillation: Continued Digoxin 125 mcg daily, Cardizem 120 mg daily, and Eliquis 5mg BID    Diabetic Neuropathy: Continued Gabapentin 300 mg TID   Hypomagnesemia: Continued Magnesium oxide 400 mg daily     HLD: Continued Pravastatin 20 mg daily    CAD: Continued Aspirin 81 mg daily   Lymphedema: Continued metolazone 2.5 mg twice a week   Pulmonary Fibrosis: Continued Esbriet 267 TID    History of PE: Continued Eliquis 5mg BID    Depression: Continued Prozac 20 mg BID  Hypothyroidism: Continued Synthroid     Pt stable for discharge to SNF. She will follow-up outpatient with podiatry.     Discharge Exam:  General Appearance: alert and oriented to person, place and time and in no acute distress  Skin: warm and dry  Head: normocephalic and atraumatic  Eyes: pupils equal, round, and reactive to light, extraocular eye movements intact, conjunctivae normal  Neck: neck supple and non tender without mass   Pulmonary/Chest: clear to auscultation bilaterally- no wheezes, rales or rhonchi, normal air movement, no respiratory distress  Cardiovascular: normal rate, normal S1 and S2 and no carotid bruits  Abdomen: soft, non-tender, non-distended, normal bowel sounds, no masses or organomegaly  Extremities: Bilateral lower extremity Ace wraps to feet. Neurologic: no cranial nerve deficit and speech normal    I/O last 3 completed shifts: In: 6349.9 [P.O.:120; I.V.:6229.9]  Out: 1000 [Urine:1000]  No intake/output data recorded. LABS:  Recent Labs     01/03/23  1104 01/04/23  0248 01/05/23  0350    143 141   K 4.4 4.1 3.7    106 103   CO2 27 28 27   BUN 44* 42* 37*   CREATININE 2.5* 2.6* 2.5*   GLUCOSE 132* 106* 90   CALCIUM 8.4* 8.2* 8.3*       Recent Labs     01/03/23  1104 01/04/23  0248 01/05/23  0350   WBC 9.9 10.9 11.3   RBC 3.11* 3.17* 3.12*   HGB 8.3* 8.5* 8.3*   HCT 29.1* 29.9* 29.3*   MCV 93.6 94.3 93.9   MCH 26.7 26.8 26.6   MCHC 28.5* 28.4* 28.3*   RDW 15.6* 15.6* 15.5*    293 326   MPV 11.4 11.2 11.1       No results for input(s): POCGLU in the last 72 hours. Imaging:  XR HAND RIGHT (MIN 3 VIEWS)    Result Date: 1/2/2023  EXAMINATION: THREE XRAY VIEWS OF THE RIGHT HAND 1/2/2023 1:56 pm COMPARISON: None. HISTORY: ORDERING SYSTEM PROVIDED HISTORY: right hand pain TECHNOLOGIST PROVIDED HISTORY: Reason for exam:->right hand pain FINDINGS: The bones are osteopenic. Severe narrowing and osteophyte formation involves the 1st carpometacarpal joint. Degenerative changes involve the interphalangeal joints to varying degrees. There is a comminuted intra-articular fracture at the base of the 1st distal phalanx. A dorsal bone fragment is displaced posteriorly and rotated. A linear metallic foreign body overlies the phalanx posteriorly.      1. Comminuted intra-articular fracture at the base of the 1st distal phalanx with posterior displacement and rotation of a posterior fracture fragment. 2. Linear metallic more in body along the posterior aspect of the 1st distal phalanx. 3. Osteoarthritis with severe degenerative changes of the 1st carpometacarpal joint and milder degenerative changes throughout the interphalangeal joints. 4. Osteopenia. XR TIBIA FIBULA LEFT (2 VIEWS)    Result Date: 1/1/2023  EXAMINATION: XRAY VIEWS OF THE LEFT TIBIA AND FIBULA 1/1/2023 1:59 pm COMPARISON: None. HISTORY: ORDERING SYSTEM PROVIDED HISTORY: anterior wound TECHNOLOGIST PROVIDED HISTORY: Reason for exam:->anterior wound FINDINGS: No fracture or dislocation. No bony erosive changes to suggest osteomyelitis. Vascular calcifications are present. No subcutaneous gas. No acute osseous abnormality involving left tibia/fibula. XR FOOT LEFT (MIN 3 VIEWS)    Result Date: 1/1/2023  EXAMINATION: THREE XRAY VIEWS OF THE LEFT FOOT 1/1/2023 4:32 pm COMPARISON: The previous study performed 07/13/2009. HISTORY: ORDERING SYSTEM PROVIDED HISTORY: r/o osteo TECHNOLOGIST PROVIDED HISTORY: Reason for exam:->r/o osteo FINDINGS: There has been interval amputation of the distal phalanx of the 2nd toe, along with a portion of the middle phalanx. There is no evidence of fracture or dislocation. Significant osseous deformities are again noted involving the foot and for the most part are without significant interval change. There has been interval osseous fusion of the bases of the metatarsal bones, with the tarsal bones. Diffuse osteopenia is again identified. No other new osseous abnormality is seen. Soft tissue swelling is noted diffusely about the foot and most prominently about the 2nd toe. Surgical staples are again seen by the hindfoot. There is no evidence to suggest osteomyelitis. If osteomyelitis needs to be excluded further, then MRI of the left foot with and without intravenous gadolinium can be considered. No fracture or dislocation.   Significant osseous deformities again noted involving the left foot, when compared to the previous study performed 07/13/2009 and without significant interval change. Patient is status post interval amputation of the distal phalanx of the 2nd toe, along with a portion of the middle phalanx. Interval osseous fusion of the bases of the metatarsals, with the tarsal bones. Diffuse osteopenia again noted. Soft tissue swelling diffusely about the foot and most prominently about the 2nd toe. No evidence to suggest osteomyelitis. If osteomyelitis needs to be excluded further, then MRI of the left foot with and without intravenous gadolinium can be considered. XR FOOT RIGHT (MIN 3 VIEWS)    Result Date: 1/1/2023  EXAMINATION: THREE XRAY VIEWS OF THE RIGHT FOOT 1/1/2023 1:59 pm COMPARISON: None. HISTORY: ORDERING SYSTEM PROVIDED HISTORY: 1st toe wound TECHNOLOGIST PROVIDED HISTORY: Reason for exam:->1st toe wound FINDINGS: No fracture or dislocation. No bony erosive changes. Severe osteoarthritis involving interphalangeal joint of the 1st digit with loss of joint space and marginal osteophytosis. Degenerative changes also associated with tarsal bone articulations and flattening of plantar arch. Vascular calcifications are present. No evidence of subcutaneous gas. 1. Severe osteoarthritis involving interphalangeal joint of the 1st digit. 2. No obvious bony erosive changes to suggest osteomyelitis. No evidence of subcutaneous gas.      MRI FOOT LEFT WO CONTRAST    Result Date: 1/3/2023  EXAMINATION: MRI OF THE LEFT FOOT WITHOUT CONTRAST, 1/2/2023 11:16 am TECHNIQUE: Multiplanar multisequence MRI of the left foot was performed without the administration of intravenous contrast. COMPARISON: Radiograph 01/01/2023 HISTORY: ORDERING SYSTEM PROVIDED HISTORY: Left foot wound TECHNOLOGIST PROVIDED HISTORY: Reason for exam:->Left foot wound FINDINGS: There is a soft tissue defect of the lateral aspect of the 5th metatarsal. Deep to the soft tissue defect, there is T2 bone marrow edema and subtle T1 marrow replacement, involving the distal 5th metatarsal, compatible with acute osteomyelitis. No well-defined drainable abscess is seen. There is subcutaneous edema. There is generalized muscle atrophy and fatty replacement. No significant tenosynovitis is seen. Prior postsurgical changes from midfoot fusion. Chronic bony changes are seen at the midfoot. Advanced degenerative changes are seen at the hindfoot and midfoot. Acute osteomyelitis of the distal 5th metatarsal. Chronic bony changes in the midfoot and hindfoot. Patient Instructions:      Medication List        START taking these medications      cefdinir 300 MG capsule  Commonly known as: OMNICEF  Take 1 capsule by mouth 2 times daily for 3 days     dilTIAZem 120 MG extended release capsule  Commonly known as: CARDIZEM 12 HR  Take 1 capsule by mouth every 12 hours  Replaces: dilTIAZem 120 MG tablet     doxycycline hyclate 100 MG tablet  Commonly known as: VIBRA-TABS  Take 1 tablet by mouth 2 times daily for 10 days     magnesium oxide 400 (240 Mg) MG tablet  Commonly known as: MAG-OX  Take 1 tablet by mouth daily  Start taking on: January 6, 2023  Replaces: MAGnesium-Oxide 400 (241.3 Mg) MG Tabs tablet     mupirocin 2 % ointment  Commonly known as: BACTROBAN  Apply topically 3 times daily.   Start taking on: January 6, 2023     simethicone 80 MG chewable tablet  Commonly known as: MYLICON  Take 1 tablet by mouth every 6 hours as needed for Flatulence     torsemide 20 MG tablet  Commonly known as: DEMADEX  Take 1 tablet by mouth daily            CHANGE how you take these medications      digoxin 125 MCG tablet  Commonly known as: LANOXIN  Take 1 tablet by mouth daily  Start taking on: January 6, 2023  What changed:   how much to take  how to take this  when to take this     docusate 100 MG Caps  Commonly known as: COLACE, DULCOLAX  Take 100 mg by mouth daily  Start taking on: January 6, 2023  What changed: See the new instructions. ferrous sulfate 325 (65 Fe) MG tablet  Commonly known as: IRON 325  Take 1 tablet by mouth daily (with breakfast)  Start taking on: January 6, 2023  What changed: See the new instructions. gabapentin 300 MG capsule  Commonly known as: NEURONTIN  Take 1 capsule by mouth 2 times daily for 30 days. What changed:   medication strength  when to take this     * insulin glargine 100 UNIT/ML injection vial  Commonly known as: LANTUS  Inject 15 Units into the skin nightly  What changed: You were already taking a medication with the same name, and this prescription was added. Make sure you understand how and when to take each. * insulin glargine 100 UNIT/ML injection vial  Commonly known as: LANTUS  Inject 22 Units into the skin every morning  Start taking on: January 6, 2023  What changed:   how much to take  when to take this  additional instructions     ipratropium-albuterol 0.5-2.5 (3) MG/3ML Soln nebulizer solution  Commonly known as: DUONEB  Take 3 mLs by nebulization every 4 hours as needed for Shortness of Breath  What changed: when to take this           * This list has 2 medication(s) that are the same as other medications prescribed for you. Read the directions carefully, and ask your doctor or other care provider to review them with you.                 CONTINUE taking these medications      acetaminophen 500 MG tablet  Commonly known as: TYLENOL     apixaban 5 MG Tabs tablet  Commonly known as: ELIQUIS     aspirin 81 MG chewable tablet     budesonide 0.5 MG/2ML nebulizer suspension  Commonly known as: Pulmicort  Take 2 mLs by nebulization 2 times daily     FLUoxetine 20 MG capsule  Commonly known as: PROZAC  Take 1 capsule by mouth 2 times daily     levothyroxine 200 MCG tablet  Commonly known as: SYNTHROID     loratadine 10 MG capsule  Commonly known as: CLARITIN     OXYGEN     pravastatin 20 MG tablet  Commonly known as: PRAVACHOL     Vitamin D3 1.25 MG (27177 UT) Caps            STOP taking these medications      canagliflozin 100 MG Tabs tablet  Commonly known as: INVOKANA     clindamycin 300 MG capsule  Commonly known as: CLEOCIN     dilTIAZem 120 MG tablet  Commonly known as: CARDIZEM  Replaced by: dilTIAZem 120 MG extended release capsule     Esbriet 801 MG Tabs  Generic drug: Pirfenidone     FISH OIL     furosemide 40 MG tablet  Commonly known as: LASIX     GLUCOSAMINE CHONDR COMPLEX PO     hydrALAZINE 50 MG tablet  Commonly known as: APRESOLINE     insulin detemir 100 UNIT/ML injection vial  Commonly known as: LEVEMIR     insulin regular 100 UNIT/ML injection  Commonly known as: HUMULIN R;NOVOLIN R     MAGnesium-Oxide 400 (241.3 Mg) MG Tabs tablet  Generic drug: magnesium oxide  Replaced by: magnesium oxide 400 (240 Mg) MG tablet     metOLazone 2.5 MG tablet  Commonly known as: ZAROXOLYN     POTASSIUM CITRATE PO     therapeutic multivitamin-minerals tablet               Where to Get Your Medications        You can get these medications from any pharmacy    Bring a paper prescription for each of these medications  doxycycline hyclate 100 MG tablet       Information about where to get these medications is not yet available    Ask your nurse or doctor about these medications  cefdinir 300 MG capsule  digoxin 125 MCG tablet  dilTIAZem 120 MG extended release capsule  docusate 100 MG Caps  ferrous sulfate 325 (65 Fe) MG tablet  FLUoxetine 20 MG capsule  gabapentin 300 MG capsule  insulin glargine 100 UNIT/ML injection vial  insulin glargine 100 UNIT/ML injection vial  ipratropium-albuterol 0.5-2.5 (3) MG/3ML Soln nebulizer solution  magnesium oxide 400 (240 Mg) MG tablet  mupirocin 2 % ointment  simethicone 80 MG chewable tablet  torsemide 20 MG tablet         In review of EMR, evaluation, management, and diagnosis. Discharge plan has been discussed with attending.  Time spent 45 mins    Signed:  Electronically signed by KRISTYN Shaw CNP on 1/5/2023 at 2:31 PM

## 2023-01-05 NOTE — PROGRESS NOTES
Department of Internal Medicine  Nephrology Progress Note      Events reviewed    SUBJECTIVE:  We are following for LAURIE on CKD. She has concerns about being able to care for herself and would like placement.      PHYSICAL EXAM:      Vitals:    VITALS:  BP (!) 163/71   Pulse 50   Temp 97.8 °F (36.6 °C) (Oral)   Resp 18   Ht 5' 8\" (1.727 m)   Wt 227 lb (103 kg)   LMP 01/29/2005   SpO2 97%   BMI 34.52 kg/m²   24HR BLOOD PRESSURE RANGE:  Systolic (21VUQ), KGM:493 , Min:149 , DSF:369 ; Diastolic (43LZU), GYM:75, Min:56, Max:71  24HR INTAKE/OUTPUT:    Intake/Output Summary (Last 24 hours) at 1/5/2023 1351  Last data filed at 1/5/2023 0700  Gross per 24 hour   Intake 6229.9 ml   Output 600 ml   Net 5629.9 ml         Constitutional:  A&O, NAD  HEENT:  PERRLA  Respiratory:  CTA  Cardiovascular/Edema:  RRR, S1/S2  Gastrointestinal:  +BS  Neurologic:  Cranial nerves II-XII intact  Skin:  warm, dry  Other:  no edema    DATA:    CBC with Differential:    Lab Results   Component Value Date/Time    WBC 11.3 01/05/2023 03:50 AM    RBC 3.12 01/05/2023 03:50 AM    HGB 8.3 01/05/2023 03:50 AM    HCT 29.3 01/05/2023 03:50 AM     01/05/2023 03:50 AM    MCV 93.9 01/05/2023 03:50 AM    MCH 26.6 01/05/2023 03:50 AM    MCHC 28.3 01/05/2023 03:50 AM    RDW 15.5 01/05/2023 03:50 AM    SEGSPCT 87.2 01/03/2022 09:15 AM    LYMPHOPCT 9.0 01/05/2023 03:50 AM    MONOPCT 5.2 01/05/2023 03:50 AM    BASOPCT 0.5 01/05/2023 03:50 AM    MONOSABS 0.59 01/05/2023 03:50 AM    LYMPHSABS 1.01 01/05/2023 03:50 AM    EOSABS 0.32 01/05/2023 03:50 AM    BASOSABS 0.06 01/05/2023 03:50 AM     CMP:    Lab Results   Component Value Date/Time     01/05/2023 03:50 AM    K 3.7 01/05/2023 03:50 AM     01/05/2023 03:50 AM    CO2 27 01/05/2023 03:50 AM    BUN 37 01/05/2023 03:50 AM    CREATININE 2.5 01/05/2023 03:50 AM    AGRATIO 0.8 08/05/2022 12:41 PM    LABGLOM 20 01/05/2023 03:50 AM    GLUCOSE 90 01/05/2023 03:50 AM    GLUCOSE 335 06/07/2012 11:00 AM    PROT 6.8 01/01/2023 02:36 PM    LABALBU 3.1 01/01/2023 02:36 PM    LABALBU 3.7 06/07/2012 11:00 AM    CALCIUM 8.3 01/05/2023 03:50 AM    BILITOT 0.3 01/01/2023 02:36 PM    ALKPHOS 67 01/01/2023 02:36 PM    AST 11 01/01/2023 02:36 PM    ALT 8 01/01/2023 02:36 PM     Magnesium:    Lab Results   Component Value Date/Time    MG 2.3 01/03/2023 11:04 AM     Phosphorus:    Lab Results   Component Value Date/Time    PHOS 3.5 01/03/2022 09:15 AM     Radiology Review:  reviewed    IMPRESSION/RECOMMENDATIONS:      LAURIE stage 1 on CKD stage IV, likely prerenal intravascular volume depletion vs progression of CKD. Creatinine stable at 2.6 mg/dL, possible new baseline   CKD stage IV, baseline creatinine 1.2-1.8 mg/dL, last office visit was 2.2 mg/dL, (increased after starting Traci). On furosemide, metolazone at home  AF, cardizem, digoxin  HTN, hydralazine  DM, SSI  UTI, on rocephin   Chest x-ray chronic changes      Plan:  Stop IV fluid. Creatinine is at new baseline. Now Cardio renal syndrome type 4  Strict I&O  Start Demadex 20 mg daily. Renal stand point OK to discharge. FU in office in 2-3 weeks. Will be benefited with CHF clinic.

## 2023-01-05 NOTE — DISCHARGE INSTR - COC
Continuity of Care Form    Patient Name: Orson Dakin   :  1955  MRN:  [id number]    Admit date:  2023  Discharge date:  23    Code Status Order: Limited   Advance Directives:   [facility] Documentation       Date/Time Healthcare Directive Type of Healthcare Directive Copy in [address] Agent's Name Healthcare Agent's Phone Number    [phone] Yes, patient has an advance directive for healthcare treatment Living will;Durable power of  for health care No, copy requested from family Adult Dianelys Barber [phone]            Admitting Physician:  Millicent France DO  PCP: Amy Rowell MD    Discharging Nurse: [facility] Unit/Room#: 5488/9643-X  Discharging Unit Phone Number: [phone]    Emergency Contact:   Extended Emergency Contact Information  Primary Emergency Contact: Jane Dong  Address: [address] Phone: [phone]  Relation: Child  Secondary Emergency Contact: Latrice Romero  Address: [address] [place] [address], [address] [place] [address] Phone: [phone]  Relation: Brother/Sister    Past Surgical History:  Past Surgical History:   Procedure Laterality Date    ANKLE FUSION      left    COLONOSCOPY      COLOSTOMY  2004    due to rupture diverticuli    FOOT DEBRIDEMENT      serveral  left    FOOT DEBRIDEMENT  2012    Left heel bone debridment removal of staple application of wound vac    FOOT DEBRIDEMENT Bilateral 2023    BILATERAL FOOT AND LEG DEBRIDEMENT, RESECTION OF ALL NONVIABLE TISSUE AND BONE, GRAFT APPLICATION performed by [facility], MD at . [facility]      right    HARDWARE REMOVAL      left foot    REVISION COLOSTOMY   reversal    SKIN GRAFT      several left foot    TONSILLECTOMY      TRACHEOSTOMY  2004    after surgery developed peritonitis and was trached Immunization History: There is no immunization history for the selected administration types on file for this patient. Active Problems:  Patient Active Problem List   Diagnosis Code    Diabetic infection of left foot ([facility]) E11.628, L08.9    Osteomyelitis (HCC) M86.9    Diabetes mellitus ([facility] E11.9    Diabetic peripheral neuropathy (HCC) E11.42    Hypothyroid E03.9    Hyperlipidemia with target LDL less than 100 E78.5    Hypertension I10    Thyroid disease E07.9    MRSA (methicillin resistant Staphylococcus aureus) A49.02    Hyperlipidemia E78.5    Neuropathy G62.9    Acid reflux K21.9    Pulmonary fibrosis ([facility]) J84.10    Hypoxia R09.02    Chronic bronchitis ([facility]) J42    Altered mental status, unspecified R41.82    UTI (urinary tract infection) N39.0    Encephalopathy G93.40    LAURIE (acute kidney injury) ([facility] N17.9       Isolation/Infection:   Isolation            No Isolation          Patient Infection Status       None to display            Nurse Assessment:  Last Vital Signs: BP (!) 163/71   Pulse 50   Temp 97.8 °F (36.6 °C) (Oral)   Resp 18   Ht 5' 8\" (1.727 m)   Wt 227 lb (103 kg)   LMP 01/29/2005   SpO2 97%   BMI 34.52 kg/m²     Last documented pain score (0-10 scale): Pain Level: 0  Last Weight:   Wt Readings from Last 1 Encounters:   01/05/23 227 lb (103 kg)     Mental Status:  oriented    IV Access:  - None    Nursing Mobility/ADLs:  Walking   Assisted  Transfer  Assisted  Bathing  Assisted  Dressing  Assisted  Toileting  Assisted  Feeding  Independent  Med Admin  Assisted  Med Delivery   whole    Wound Care Documentation and Therapy:  Incision 08/29/12 Foot Left (Active)   Number of days: 6413       Wound 01/01/23 Pretibial Left (Active)   Dressing Status Clean;Dry; Intact 01/05/23 1041   Wound Cleansed Cleansed with saline 01/01/23 1935   Dressing/Treatment Other (comment) 01/01/23 1935   Wound Length (cm) 8 cm 01/01/23 1935   Wound Width (cm) 5 cm 01/01/23 1935   Wound Surface Area (cm^2) 40 cm^2 01/01/23 1935   Wound Assessment Bleeding;[place]/red;Superficial 01/01/23 1935   Drainage Amount Small 01/01/23 1935   Drainage Description Sanguinous 01/01/23 1935   [place]-wound Assessment Fragile; Other (Comment) 01/01/23 1935   Number of days: 3       Wound 01/01/23 Toe (Comment  which one) Anterior;Right (Active)   Dressing Status Clean;Dry; Intact; Other (Comment) 01/05/23 1041   Wound Cleansed Cleansed with saline 01/01/23 1935   Dressing/Treatment Other (comment) 01/01/23 1935   Wound Assessment Other (Comment) 01/05/23 1041   Drainage Amount Small 01/01/23 1935   Drainage Description Sanguinous 01/01/23 1935   [place]-wound Assessment Fragile 01/01/23 1935   Number of days: 3       Wound 01/01/23 Foot Anterior;Left;Outer (Active)   Dressing Status Clean;Dry; Intact; Other (Comment) 01/05/23 1041   Wound Cleansed Cleansed with saline 01/01/23 1935   Dressing/Treatment Other (comment) 01/01/23 1935   Wound Length (cm) 3 cm 01/01/23 1935   Wound Width (cm) 1.8 cm 01/01/23 1935   Wound Surface Area (cm^2) 5.4 cm^2 01/01/23 1935   Wound Assessment Other (Comment) 01/05/23 1041   Drainage Amount None 01/01/23 1935   Number of days: 3       Wound 01/04/23 Finger (Comment which one) Anterior;Right (Active)   Wound Image    01/04/23 1335   Wound Etiology Burn 01/04/23 1335   Dressing Status New dressing applied 01/04/23 1651   Wound Cleansed Cleansed with saline 01/04/23 1651   Dressing/Treatment Pharmaceutical agent (see MAR); Dry dressing 01/05/23 1041   Wound Length (cm) 3 cm 01/04/23 1335   Wound Width (cm) 2 cm 01/04/23 1335   Wound Surface Area (cm^2) 6 cm^2 01/04/23 1335   Wound Assessment Eschar dry 01/04/23 1335   Drainage Amount None 01/04/23 1335   Odor None 01/04/23 1335   [place]-wound Assessment Dry/flaky 01/04/23 1335   Number of days: 0       Incision 01/02/23 Pretibial Right (Active)   Dressing Status Clean;Dry; Intact; Other (Comment) 01/05/23 1041   Dressing/Treatment Adhesive bandage 01/02/23 6540 Closure Other (Comment) 01/02/23 1822   Margins Other (Comment) 01/02/23 1822   Incision Assessment Other (Comment) 01/05/23 1041   Number of days: 3       Incision 01/02/23 Foot Anterior;Right (Active)   Dressing Status Clean;Dry; Intact 01/05/23 1041   Dressing/Treatment Petroleum impregnated gauze;Gauze dressing/dressing sponge;Roll gauze; Ace wrap 01/02/23 1827   Incision Assessment Other (Comment) 01/05/23 1041   Number of days: 3       Incision 01/02/23 Pretibial Left (Active)   Dressing Status Clean;Dry; Intact; Other (Comment) 01/05/23 1041   Dressing/Treatment Ace wrap 01/02/23 1852   Closure Other (Comment) 01/02/23 1822   Incision Assessment Other (Comment) 01/05/23 1041   Drainage Amount Other (Comment) 01/02/23 1822   Number of days: 3       Incision 01/02/23 Anterior; Left (Active)   Dressing Status Clean;Dry; Intact 01/05/23 1041   Incision Assessment Other (Comment) 01/05/23 1041   Number of days: 3        Elimination:  Continence: Bowel: Yes  Bladder: Yes  Urinary Catheter: None   Colostomy/Ileostomy/Ileal Conduit: No       Date of Last BM: 1/4    Intake/Output Summary (Last 24 hours) at 1/5/2023 1256  Last data filed at 1/5/2023 0700  Gross per 24 hour   Intake 6229.9 ml   Output 600 ml   Net 5629.9 ml     I/O last 3 completed shifts: In: 6349.9 [P.O.:120; I.V.:6229.9]  Out: 1000 [Urine:1000]    Safety Concerns: At Risk for Falls    Impairments/Disabilities:      None    Nutrition Therapy:  Current Nutrition Therapy:   - Oral Diet:  Carb Control 4 carbs/meal (1800kcals/day)    Routes of Feeding: Oral  Liquids: No Restrictions  Daily Fluid Restriction: no  Last Modified Barium Swallow with Video (Video Swallowing Test): not done    Treatments at the Time of Hospital Discharge:   Respiratory Treatments: ***  Oxygen Therapy:  is on oxygen at 4 L/min per nasal cannula.   Ventilator:    - No ventilator support    Rehab Therapies: Physical Therapy and Occupational Therapy  Weight Bearing Status/Restrictions: No weight bearing restrictions  Other Medical Equipment (for information only, NOT a DME order):  walker  Other Treatments: ***    Patient's personal belongings (please select all that are sent with patient):  None    RN SIGNATURE:  Electronically signed by María Teague RN on 1/5/23 at 3:23 PM EST    CASE MANAGEMENT/SOCIAL WORK SECTION    Inpatient Status Date: 1/1/2023    Readmission Risk Assessment Score:  Readmission Risk              Risk of Unplanned Readmission:  23           Discharging to Facility/ Agency   Name: [facility]  Address: [address] [place] [address]  Phone: [phone]  Fax: [phone]    Dialysis Facility (if applicable)   Name:  Address:  Dialysis Schedule:  Phone:  Fax:    / signature: Electronically signed by Donna Oliveira RN on 1/5/23 at 12:56 PM EST    PHYSICIAN SECTION    Prognosis: {Prognosis:7035579516}    Condition at Discharge: Gloria Birch Patient Condition:[id number]}    Rehab Potential (if transferring to Rehab): {Prognosis:9491158307}    Recommended Labs or Other Treatments After Discharge: ***    Physician Certification: I certify the above information and transfer of Kristina Ohara  is necessary for the continuing treatment of the diagnosis listed and that she requires {Admit to Appropriate Level of Care:03196} for {GREATER/LESS:293865864} 30 days.      Update Admission H&P: {CHP DME Changes in [facility]WHI:398519739}    PHYSICIAN SIGNATURE:  {Esignature:[id number]}

## 2023-01-05 NOTE — CARE COORDINATION
Met with patient at bedside. She voices her intentions to seek SNF stay post hospital discharge. She declines the offered list of providers and names [facility] as choice (her daughter is employed at facility). A referral is called to Wen Hoffmann with same. Will await her review and response regarding bed availability/acceptance. BRIAN Del Rosario RN  [facility] Case Management  [facility] can accept patient for care. Spoke with primary team, discharge remains dependent on nephrology POV. Patient updated and voiced understanding. Will make dc arrangements once cleared by nephrology. BRIAN Del Rosario RN  [facility] Case Management  [phone]    Cleared for discharge  Facility has and can accept patient for SNF stay today. Non emergency transportation has been arranged with [facility],  time 400PM.  Patient, facility liaison and bedside nurse notified of scheduled  time. YO initiated, envelope completed with demos, transport forms and [id number]. BRIAN Del Rosario RN  [facility] Case Management  [phone]

## 2023-01-05 NOTE — PROGRESS NOTES
Department of Podiatry   Progress Note      Subjective: Patient being seen for follow up s/p b/l foot and leg debridement with skin graft application(DOS:1/2/23). States that she would like to be d/c to a SNF. Discharge pending nephrology. No acute pedal complaints.       Past Medical History:        Diagnosis Date    Acid reflux     Adult respiratory distress syndrome ([address]     after surgery was trached and was reversed has been resolved    Arthritis     osteo    Arthritis     Blood transfusion     with colon surgery and 1st foot surgery    Bronchitis     Chronic knee pain     Chronic sinusitis     Colitis     COPD (chronic obstructive pulmonary disease) ([facility])     stable no medications    Depression     Diabetes mellitus ([facility])     blood sugars run high per pt    Diverticulitis     Gall stones     H/O chest x-ray     Hemorrhoids     Hyperlipidemia     Hypertension     stable per pt    Hypothyroidism     MRSA (methicillin resistant Staphylococcus aureus) 2007    in left foot  resolved    Neuropathy     Numbness     Obesity     Overweight(278.02)     PICC (peripherally inserted central catheter) in place 09/2012    right upper arm for present antibiotic therapy    SOBOE (shortness of breath on exertion)     Thyroid disease     Urinary anomaly frequency,leakage,urgency, UTI    Varicose veins     Wheezing        Past Surgical History:        Procedure Laterality Date    ANKLE FUSION  2007    left    COLONOSCOPY      COLOSTOMY  2004    due to rupture diverticuli    FOOT DEBRIDEMENT      serveral  left    FOOT DEBRIDEMENT  08/29/2012    Left heel bone debridment removal of staple application of wound vac    FOOT DEBRIDEMENT Bilateral 1/2/2023    BILATERAL FOOT AND LEG DEBRIDEMENT, RESECTION OF ALL NONVIABLE TISSUE AND BONE, GRAFT APPLICATION performed by [facility], MD at . [address]      right    HARDWARE REMOVAL  2008    left foot    REVISION COLOSTOMY  2005 reversal    SKIN GRAFT      several left foot    TONSILLECTOMY      TRACHEOSTOMY  2004    after surgery developed peritonitis and was trached       Medications Prior to Admission:    Medications Prior to Admission: insulin glargine (LANTUS) 100 UNIT/ML injection vial, Inject into the skin 2 times daily 45 in the morning and 35 at night  ipratropium-albuterol (DUONEB) 0.5-2.5 (3) MG/3ML SOLN nebulizer solution, Take 3 mLs by nebulization every 6 hours as needed for Shortness of Breath  budesonide (PULMICORT) 0.5 MG/2ML nebulizer suspension, Take 2 mLs by nebulization 2 times daily  MAGNESIUM-OXIDE 400 (241.3 Mg) MG TABS tablet, TAKE 2 TABLETS BY MOUTH EVERY DAY  loratadine (CLARITIN) 10 MG capsule,   digoxin (LANOXIN) 125 MCG tablet,   dilTIAZem (CARDIZEM) 120 MG tablet, Take 120 mg by mouth in the morning and 120 mg in the evening. docusate sodium (COLACE) 100 MG capsule, Twice a Day PRN  apixaban (ELIQUIS) 5 MG TABS tablet,   ferrous sulfate (IRON 325) 325 (65 Fe) MG tablet, Daily  furosemide (LASIX) 40 MG tablet,   metOLazone (ZAROXOLYN) 2.5 MG tablet,   OXYGEN, 4 L  pravastatin (PRAVACHOL) 20 MG tablet, Take 20 mg by mouth daily. Cholecalciferol (VITAMIN D3) 20698 UNITS CAPS, Take  by mouth every 7 days. acetaminophen (TYLENOL) 500 MG tablet, Take 500 mg by mouth every 6 hours as needed for Pain. insulin regular (HUMULIN R;NOVOLIN R) 100 UNIT/ML injection, Inject  into the skin See Admin Instructions. Sliding scale as needed 150-200 5 units  201-250 10 units  251-300 15 units 301-350 17 units 351-400 20 units  gabapentin (NEURONTIN) 100 MG capsule, Take 300 mg by mouth 3 times daily. levothyroxine (SYNTHROID) 200 MCG tablet, Take 200 mcg by mouth daily. Instructed to take with sip water am of surgery, 09/26/2012  FLUoxetine (PROZAC) 20 MG capsule, Take 20 mg by mouth 2 times daily. aspirin 81 MG chewable tablet, Take 81 mg by mouth daily.  Instructed lpn to contact dr hurt re: preop instructions  insulin detemir (LEVEMIR) 100 UNIT/ML injection vial, Inject 60 Units into the skin every morning (before breakfast) 60 units at 0700; 55 units  At 4 p.m.  Pirfenidone (ESBRIET) 801 MG TABS, Take by mouth (Patient not taking: Reported on 1/1/2023)  canagliflozin (INVOKANA) 100 MG TABS tablet, Take 100 mg by mouth every morning (before breakfast). (Patient not taking: No sig reported)  POTASSIUM CITRATE PO, Take  by mouth daily. (Patient not taking: Reported on 1/1/2023)  Glucosamine-Chondroitin (GLUCOSAMINE CHONDR COMPLEX PO), Take  by mouth daily. (Patient not taking: No sig reported)  hydrALAZINE (APRESOLINE) 50 MG tablet, Take 50 mg by mouth 3 times daily. Instructed to take morning of surgery, 09/26/2012,with a sip of water (Patient not taking: No sig reported)  therapeutic multivitamin-minerals (THERAGRAN-M) tablet, Take 1 tablet by mouth daily.   (Patient not taking: Reported on 5/24/2022)  FISH OIL, Take 1 capsule by mouth 2 times daily. Contact dr hurt re: preop instructions (Patient not taking: No sig reported)    Allergies:  Piperacillin sod-tazobactam so, Sulfa antibiotics, and Vancomycin    Social History:   TOBACCO:   reports that she quit smoking about 18 years ago. Her smoking use included cigarettes. She started smoking about 52 years ago. She has a 60.00 pack-year smoking history. She has never used smokeless tobacco.  ETOH:   reports current alcohol use.  DRUGS:   Social History     Substance and Sexual Activity   Drug Use No       Family History:       Problem Relation Age of Onset    Prostate Cancer Father     Coronary Art Dis Mother     Diabetes Brother     Stroke Brother          REVIEW OF SYSTEMS:    All pertinent positives and negatives as noted in HPI       LOWER EXTREMITY EXAMINATION   B/L lower extremity dressings noted to be cdi with no excessive drainage. Patient able to actively range all digits without difficulty. No signs of venous congestion noted    PREVIOUS physical exam findings:   VASCULAR:  DP and PT pulses  are palpable. CFT < 5 seconds B/L. Warm to warm from the tibial tuberosity to the distal aspect of the digits dorsally. NEUROLOGIC:  Protective sensation is diminished     DERM:    - 1st and 2nd digit of the right foot wounds. Drainage noted to the wounds. Wound is scabbed over. No malodor noted. Edema and erythema noted to the digits. - Anterior left leg wound. Superficial in nature. Drainage noted to the wound. Wound measures 6cm x 3cm. Erythema and edema noted. Wound base is granular. - Left lateral wound noted. Wound measures 4 cm x 2 cm x 0.1 cm. Wound bed is fibrotic with a hyperkeratotic rim. No drainage, no malodor noted. Edema and erythema noted to the wound. MUSCULOSKELETAL: No deformities noted. 5/5 Gross Muscle strength in all 4 quadrants.                         CONSULTS:  IP CONSULT TO INFECTIOUS DISEASES  IP CONSULT TO PODIATRY  IP CONSULT TO ORTHOPEDIC SURGERY  IP CONSULT TO NEPHROLOGY  IP CONSULT TO IV TEAM    MEDICATION:  Scheduled Meds:   gabapentin  300 mg Oral BID    mupirocin   Topical Daily    cefTRIAXone (ROCEPHIN) IV  1,000 mg IntraVENous Q24H    apixaban  5 mg Oral BID    aspirin  81 mg Oral Daily    budesonide  0.5 mg Nebulization BID    digoxin  125 mcg Oral Daily    dilTIAZem  120 mg Oral 2 times per day    docusate sodium  100 mg Oral Daily    ferrous sulfate  325 mg Oral Daily with breakfast    FLUoxetine  20 mg Oral BID    insulin glargine  22 Units SubCUTAneous QAM    insulin glargine  15 Units SubCUTAneous Nightly    insulin lispro  0-4 Units SubCUTAneous TID WC    insulin lispro  0-4 Units SubCUTAneous Nightly    levothyroxine  200 mcg Oral Daily    magnesium oxide  400 mg Oral Daily    [Held by provider] metOLazone  2.5 mg Oral Daily    pravastatin  20 mg Oral Daily    sodium chloride flush  5-40 mL IntraVENous 2 times per day    miconazole   Topical BID     Continuous Infusions:   sodium chloride      dextrose       PRN Meds:.simethicone, acetaminophen, traMADol, HYDROmorphone, ipratropium-albuterol, sodium chloride flush, sodium chloride, ondansetron **OR** ondansetron, polyethylene glycol, glucose, dextrose bolus **OR** dextrose bolus, glucagon (rDNA), dextrose, white petrolatum    RADIOLOGY:  XR CHEST PORTABLE   Final Result   Extensive bilateral interstitial and alveolar infiltrates and chronic changes         US DUP LOWER EXTREMITIES BILATERAL VENOUS   Final Result   No evidence of DVT in either lower extremity. XR HAND RIGHT (MIN 3 VIEWS)   Final Result   1. Comminuted intra-articular fracture at the base of the 1st distal phalanx   with posterior displacement and rotation of a posterior fracture fragment. 2. Linear metallic more in body along the posterior aspect of the 1st distal   phalanx. 3. Osteoarthritis with severe degenerative changes of the 1st carpometacarpal   joint and milder degenerative changes throughout the interphalangeal joints. 4. Osteopenia. MRI FOOT LEFT WO CONTRAST   Final Result   Acute osteomyelitis of the distal 5th metatarsal.      Chronic bony changes in the midfoot and hindfoot. XR FOOT LEFT (MIN 3 VIEWS)   Final Result   No fracture or dislocation. Significant osseous deformities again noted   involving the left foot, when compared to the previous study performed   07/13/2009 and without significant interval change. Patient is status post   interval amputation of the distal phalanx of the 2nd toe, along with a   portion of the middle phalanx. Interval osseous fusion of the bases of the   metatarsals, with the tarsal bones. Diffuse osteopenia again noted. Soft   tissue swelling diffusely about the foot and most prominently about the 2nd   toe. No evidence to suggest osteomyelitis. If osteomyelitis needs to be   excluded further, then MRI of the left foot with and without intravenous   gadolinium can be considered.          XR TIBIA FIBULA LEFT (2 VIEWS)   Final Result   No acute osseous abnormality involving left tibia/fibula. XR FOOT RIGHT (MIN 3 VIEWS)   Final Result   1. Severe osteoarthritis involving interphalangeal joint of the 1st digit. 2. No obvious bony erosive changes to suggest osteomyelitis. No evidence of   subcutaneous gas. VL LOWER EXTREMITY ARTERIAL SEGMENTAL PRESSURES W PPG BILATERAL    (Results Pending)   MRI FOOT RIGHT WO CONTRAST    (Results Pending)       Vitals:    BP (!) 163/71   Pulse 50   Temp 97.8 °F (36.6 °C) (Oral)   Resp 18   Ht 5' 8\" (1.727 m)   Wt 227 lb (103 kg)   LMP 01/29/2005   SpO2 97%   BMI 34.52 kg/m²     LABS:   Recent Labs     01/04/23  0248 01/05/23  0350   WBC 10.9 11.3   HGB 8.5* 8.3*   HCT 29.9* 29.3*    326     Recent Labs     01/05/23  0350      K 3.7      CO2 27   BUN 37*   CREATININE 2.5*     No results for input(s): PROT, INR, APTT in the last 72 hours. ASSESSMENTS:   - s/p b/l foot and leg debridement with skin graft application(DOS:1/2/23)  -Right 1st and 2nd digit wounds, POA   - Left anterior leg wound, POA   - Left lateral foot wound, POA   - Type II diabetes   - Hyperlipidemia     PLAN:  - Examined and evaluated  - All labs, imaging, and charts reviewed  - Preliminary surgical cx: staph aureus, corynebacter, GNR  - Abx per ID: No abx warranted at this time  - Venous US: No DVT   - Arterial studies: pending   - WBAT in surgical shoe to B/L lower extremity   - B/L lower extremity dressings to remain intact with podiatry managing  - No further surgical intervention indicated. Stable for d/c from Podiatry standpoint and once cleared by all other teams on board   - Will discharge with prescription for Doxycycline in soft chart. - Patient to follow up on 1/11 at [facility] at 1 pm for continued wound care. - Will continue to monitor while in-house   - Discussed with Dr. Ed Martinez      Thank you for involving podiatry in this patient's care.  Please do not hesitate to call with any questions, concerns, or new findings

## 2023-01-05 NOTE — PATIENT CARE CONFERENCE
P Quality Flow/Interdisciplinary Rounds Progress Note        Quality Flow Rounds held on January 5, 2023    Disciplines Attending:  Bedside Nurse, , , and Nursing Unit Leadership    Julieta Bridges was admitted on 1/1/2023  2:10 PM    Anticipated Discharge Date:       Disposition:    David Score:  David Scale Score: 16    Readmission Risk              Risk of Unplanned Readmission:  23           Discussed patient goal for the day, patient clinical progression, and barriers to discharge.   The following Goal(s) of the Day/Commitment(s) have been identified:  Diagnostics - Report Results      Mary Zurita RN  January 5, 2023

## 2023-01-06 LAB
BLOOD CULTURE, ROUTINE: NORMAL
CULTURE SURGICAL: ABNORMAL
CULTURE, BLOOD 2: NORMAL
ORGANISM: ABNORMAL
ORGANISM: ABNORMAL

## 2023-01-07 LAB
CULTURE SURGICAL: ABNORMAL
ORGANISM: ABNORMAL

## (undated) DEVICE — CONMED REFLEX RHS SINGLE USE, RELOADABLE, ROTATING HEAD SKIN STAPLER: Brand: CONMED REFLEX

## (undated) DEVICE — GLOVE ORTHO 7 1/2   MSG9475

## (undated) DEVICE — HANDPIECE SET WITH BONE CLEANING TIP AND SUCTION TUBE: Brand: INTERPULSE

## (undated) DEVICE — TRAP,MUCUS SPECIMEN,40CC: Brand: MEDLINE

## (undated) DEVICE — HANDPIECE SET WITH HIGH FLOW TIP AND SUCTION TUBE: Brand: INTERPULSE

## (undated) DEVICE — AIR SHEET,LAT,COMFORT GLIDE, BLEND 40X80: Brand: MEDLINE

## (undated) DEVICE — DRESSING PETRO W3XL8IN OIL EMUL N ADH GZ KNIT IMPREG CELOS

## (undated) DEVICE — GAUZE,SPONGE,4"X4",8PLY,STRL,LF,10/TRAY: Brand: MEDLINE

## (undated) DEVICE — LOWER EXT KNEE DRAPE: Brand: MEDLINE INDUSTRIES, INC.

## (undated) DEVICE — SHOE POSTOP M MAN 9-11 UNIV FOAM TRICOT SEMI FLX SKID

## (undated) DEVICE — STOCKINETTE,DOUBLE PLY,6X48,STERILE: Brand: MEDLINE

## (undated) DEVICE — NEEDLE BNE MAR ASPIR 11GA L4IN TWO PC HNDL W/ PRB JAMSH

## (undated) DEVICE — SOLUTION IRRIG 3000ML 0.9% SOD CHL USP UROMATIC PLAS CONT

## (undated) DEVICE — BANDAGE,GAUZE,4.5"X4.1YD,STERILE,LF: Brand: MEDLINE

## (undated) DEVICE — CONTAINER SPEC ANAERB VACTNR

## (undated) DEVICE — TRAP,MUCUS SPECIMEN, 80CC: Brand: MEDLINE

## (undated) DEVICE — SHOE POSTOP M WOMAN 6-8 UNIV FOAM TRICOT SEMI FLX SKID